# Patient Record
Sex: FEMALE | Race: BLACK OR AFRICAN AMERICAN | Employment: FULL TIME | URBAN - METROPOLITAN AREA
[De-identification: names, ages, dates, MRNs, and addresses within clinical notes are randomized per-mention and may not be internally consistent; named-entity substitution may affect disease eponyms.]

---

## 2021-06-28 ENCOUNTER — HOSPITAL ENCOUNTER (INPATIENT)
Age: 46
LOS: 4 days | Discharge: HOME | DRG: 603 | End: 2021-07-02
Attending: EMERGENCY MEDICINE | Admitting: FAMILY MEDICINE
Payer: COMMERCIAL

## 2021-06-28 ENCOUNTER — APPOINTMENT (OUTPATIENT)
Dept: GENERAL RADIOLOGY | Age: 46
DRG: 603 | End: 2021-06-28
Attending: EMERGENCY MEDICINE
Payer: COMMERCIAL

## 2021-06-28 DIAGNOSIS — I96 WET GANGRENE (HCC): ICD-10-CM

## 2021-06-28 DIAGNOSIS — L08.9 LEFT FOOT INFECTION: ICD-10-CM

## 2021-06-28 DIAGNOSIS — L03.90 CELLULITIS, UNSPECIFIED CELLULITIS SITE: Primary | ICD-10-CM

## 2021-06-28 LAB
ALBUMIN SERPL-MCNC: 3.9 G/DL (ref 3.5–5)
ALBUMIN/GLOB SERPL: 0.9 {RATIO} (ref 1.2–3.5)
ALP SERPL-CCNC: 73 U/L (ref 50–136)
ALT SERPL-CCNC: 16 U/L (ref 12–65)
ANION GAP SERPL CALC-SCNC: 9 MMOL/L (ref 7–16)
AST SERPL-CCNC: 16 U/L (ref 15–37)
BASOPHILS # BLD: 0.1 K/UL (ref 0–0.2)
BASOPHILS NFR BLD: 1 % (ref 0–2)
BILIRUB SERPL-MCNC: 0.4 MG/DL (ref 0.2–1.1)
BUN SERPL-MCNC: 8 MG/DL (ref 6–23)
CALCIUM SERPL-MCNC: 8.9 MG/DL (ref 8.3–10.4)
CHLORIDE SERPL-SCNC: 107 MMOL/L (ref 98–107)
CO2 SERPL-SCNC: 23 MMOL/L (ref 21–32)
CREAT SERPL-MCNC: 0.66 MG/DL (ref 0.6–1)
DIFFERENTIAL METHOD BLD: ABNORMAL
EOSINOPHIL # BLD: 0.3 K/UL (ref 0–0.8)
EOSINOPHIL NFR BLD: 4 % (ref 0.5–7.8)
ERYTHROCYTE [DISTWIDTH] IN BLOOD BY AUTOMATED COUNT: 12.4 % (ref 11.9–14.6)
GLOBULIN SER CALC-MCNC: 4.4 G/DL (ref 2.3–3.5)
GLUCOSE SERPL-MCNC: 88 MG/DL (ref 65–100)
HCT VFR BLD AUTO: 37.5 % (ref 35.8–46.3)
HGB BLD-MCNC: 12.8 G/DL (ref 11.7–15.4)
IMM GRANULOCYTES # BLD AUTO: 0 K/UL (ref 0–0.5)
IMM GRANULOCYTES NFR BLD AUTO: 0 % (ref 0–5)
INR PPP: 1.2
LACTATE SERPL-SCNC: 0.9 MMOL/L (ref 0.4–2)
LYMPHOCYTES # BLD: 1.7 K/UL (ref 0.5–4.6)
LYMPHOCYTES NFR BLD: 20 % (ref 13–44)
MCH RBC QN AUTO: 29.1 PG (ref 26.1–32.9)
MCHC RBC AUTO-ENTMCNC: 34.1 G/DL (ref 31.4–35)
MCV RBC AUTO: 85.2 FL (ref 79.6–97.8)
MONOCYTES # BLD: 1 K/UL (ref 0.1–1.3)
MONOCYTES NFR BLD: 11 % (ref 4–12)
NEUTS SEG # BLD: 5.7 K/UL (ref 1.7–8.2)
NEUTS SEG NFR BLD: 65 % (ref 43–78)
NRBC # BLD: 0 K/UL (ref 0–0.2)
PLATELET # BLD AUTO: 229 K/UL (ref 150–450)
PMV BLD AUTO: 8.3 FL (ref 9.4–12.3)
POTASSIUM SERPL-SCNC: 3.5 MMOL/L (ref 3.5–5.1)
PROT SERPL-MCNC: 8.3 G/DL (ref 6.3–8.2)
PROTHROMBIN TIME: 15.3 SEC (ref 12.5–14.7)
RBC # BLD AUTO: 4.4 M/UL (ref 4.05–5.2)
SODIUM SERPL-SCNC: 139 MMOL/L (ref 136–145)
WBC # BLD AUTO: 8.8 K/UL (ref 4.3–11.1)

## 2021-06-28 PROCEDURE — 80053 COMPREHEN METABOLIC PANEL: CPT

## 2021-06-28 PROCEDURE — 87077 CULTURE AEROBIC IDENTIFY: CPT

## 2021-06-28 PROCEDURE — 74011000258 HC RX REV CODE- 258: Performed by: FAMILY MEDICINE

## 2021-06-28 PROCEDURE — 99284 EMERGENCY DEPT VISIT MOD MDM: CPT

## 2021-06-28 PROCEDURE — 85025 COMPLETE CBC W/AUTO DIFF WBC: CPT

## 2021-06-28 PROCEDURE — 87070 CULTURE OTHR SPECIMN AEROBIC: CPT

## 2021-06-28 PROCEDURE — 73630 X-RAY EXAM OF FOOT: CPT

## 2021-06-28 PROCEDURE — 99221 1ST HOSP IP/OBS SF/LOW 40: CPT | Performed by: PHYSICIAN ASSISTANT

## 2021-06-28 PROCEDURE — 96365 THER/PROPH/DIAG IV INF INIT: CPT

## 2021-06-28 PROCEDURE — 87186 SC STD MICRODIL/AGAR DIL: CPT

## 2021-06-28 PROCEDURE — 74011250637 HC RX REV CODE- 250/637: Performed by: FAMILY MEDICINE

## 2021-06-28 PROCEDURE — 74011250636 HC RX REV CODE- 250/636: Performed by: FAMILY MEDICINE

## 2021-06-28 PROCEDURE — 74011250636 HC RX REV CODE- 250/636: Performed by: EMERGENCY MEDICINE

## 2021-06-28 PROCEDURE — 83605 ASSAY OF LACTIC ACID: CPT

## 2021-06-28 PROCEDURE — 74011000258 HC RX REV CODE- 258: Performed by: EMERGENCY MEDICINE

## 2021-06-28 PROCEDURE — 87040 BLOOD CULTURE FOR BACTERIA: CPT

## 2021-06-28 PROCEDURE — 2709999900 HC NON-CHARGEABLE SUPPLY

## 2021-06-28 PROCEDURE — 96367 TX/PROPH/DG ADDL SEQ IV INF: CPT

## 2021-06-28 PROCEDURE — 65270000029 HC RM PRIVATE

## 2021-06-28 PROCEDURE — 74011250636 HC RX REV CODE- 250/636: Performed by: INTERNAL MEDICINE

## 2021-06-28 PROCEDURE — 85610 PROTHROMBIN TIME: CPT

## 2021-06-28 RX ORDER — HYDROCODONE BITARTRATE AND ACETAMINOPHEN 5; 325 MG/1; MG/1
1 TABLET ORAL
Status: DISCONTINUED | OUTPATIENT
Start: 2021-06-28 | End: 2021-07-01

## 2021-06-28 RX ORDER — ENOXAPARIN SODIUM 100 MG/ML
40 INJECTION SUBCUTANEOUS DAILY
Status: DISCONTINUED | OUTPATIENT
Start: 2021-06-28 | End: 2021-07-02 | Stop reason: HOSPADM

## 2021-06-28 RX ORDER — ONDANSETRON 2 MG/ML
4 INJECTION INTRAMUSCULAR; INTRAVENOUS
Status: DISCONTINUED | OUTPATIENT
Start: 2021-06-28 | End: 2021-07-02 | Stop reason: HOSPADM

## 2021-06-28 RX ORDER — ACETAMINOPHEN 650 MG/1
650 SUPPOSITORY RECTAL
Status: DISCONTINUED | OUTPATIENT
Start: 2021-06-28 | End: 2021-07-02 | Stop reason: HOSPADM

## 2021-06-28 RX ORDER — VANCOMYCIN HYDROCHLORIDE
1250 ONCE
Status: COMPLETED | OUTPATIENT
Start: 2021-06-28 | End: 2021-06-28

## 2021-06-28 RX ORDER — ACETAMINOPHEN 325 MG/1
650 TABLET ORAL
Status: DISCONTINUED | OUTPATIENT
Start: 2021-06-28 | End: 2021-07-02 | Stop reason: HOSPADM

## 2021-06-28 RX ORDER — CLINDAMYCIN PHOSPHATE 600 MG/50ML
600 INJECTION INTRAVENOUS
Status: COMPLETED | OUTPATIENT
Start: 2021-06-28 | End: 2021-06-28

## 2021-06-28 RX ORDER — MORPHINE SULFATE 2 MG/ML
2 INJECTION, SOLUTION INTRAMUSCULAR; INTRAVENOUS
Status: DISCONTINUED | OUTPATIENT
Start: 2021-06-28 | End: 2021-07-02 | Stop reason: HOSPADM

## 2021-06-28 RX ORDER — POLYETHYLENE GLYCOL 3350 17 G/17G
17 POWDER, FOR SOLUTION ORAL DAILY PRN
Status: DISCONTINUED | OUTPATIENT
Start: 2021-06-28 | End: 2021-07-02 | Stop reason: HOSPADM

## 2021-06-28 RX ORDER — SODIUM CHLORIDE 0.9 % (FLUSH) 0.9 %
5-40 SYRINGE (ML) INJECTION AS NEEDED
Status: DISCONTINUED | OUTPATIENT
Start: 2021-06-28 | End: 2021-07-02 | Stop reason: HOSPADM

## 2021-06-28 RX ORDER — KETOROLAC TROMETHAMINE 15 MG/ML
15 INJECTION, SOLUTION INTRAMUSCULAR; INTRAVENOUS ONCE
Status: COMPLETED | OUTPATIENT
Start: 2021-06-28 | End: 2021-06-28

## 2021-06-28 RX ORDER — ONDANSETRON 4 MG/1
4 TABLET, ORALLY DISINTEGRATING ORAL
Status: DISCONTINUED | OUTPATIENT
Start: 2021-06-28 | End: 2021-07-02 | Stop reason: HOSPADM

## 2021-06-28 RX ORDER — SODIUM CHLORIDE 0.9 % (FLUSH) 0.9 %
5-40 SYRINGE (ML) INJECTION EVERY 8 HOURS
Status: DISCONTINUED | OUTPATIENT
Start: 2021-06-28 | End: 2021-07-02 | Stop reason: HOSPADM

## 2021-06-28 RX ADMIN — MORPHINE SULFATE 2 MG: 2 INJECTION, SOLUTION INTRAMUSCULAR; INTRAVENOUS at 08:27

## 2021-06-28 RX ADMIN — KETOROLAC TROMETHAMINE 15 MG: 15 INJECTION, SOLUTION INTRAMUSCULAR; INTRAVENOUS at 10:36

## 2021-06-28 RX ADMIN — PIPERACILLIN AND TAZOBACTAM 3.38 G: 3; .375 INJECTION, POWDER, LYOPHILIZED, FOR SOLUTION INTRAVENOUS at 15:36

## 2021-06-28 RX ADMIN — PIPERACILLIN AND TAZOBACTAM 3.38 G: 3; .375 INJECTION, POWDER, LYOPHILIZED, FOR SOLUTION INTRAVENOUS at 21:05

## 2021-06-28 RX ADMIN — VANCOMYCIN HYDROCHLORIDE 1250 MG: 10 INJECTION, POWDER, LYOPHILIZED, FOR SOLUTION INTRAVENOUS at 06:34

## 2021-06-28 RX ADMIN — Medication 10 ML: at 15:37

## 2021-06-28 RX ADMIN — HYDROCODONE BITARTRATE AND ACETAMINOPHEN 1 TABLET: 5; 325 TABLET ORAL at 08:16

## 2021-06-28 RX ADMIN — Medication 10 ML: at 21:05

## 2021-06-28 RX ADMIN — ENOXAPARIN SODIUM 40 MG: 40 INJECTION SUBCUTANEOUS at 10:36

## 2021-06-28 RX ADMIN — PIPERACILLIN SODIUM AND TAZOBACTAM SODIUM 4.5 G: 4; .5 INJECTION, POWDER, LYOPHILIZED, FOR SOLUTION INTRAVENOUS at 05:35

## 2021-06-28 RX ADMIN — CLINDAMYCIN PHOSPHATE 600 MG: 600 INJECTION, SOLUTION INTRAVENOUS at 06:05

## 2021-06-28 RX ADMIN — Medication 10 ML: at 07:00

## 2021-06-28 NOTE — WOUND CARE
Left foot with intertrigo, macerated skin between all toes, clear fluid filled blisters on dorsal foot and planter foot around the 3rd and 4th toes. Patient was seen and treated by a foot care doctor and given Naftin topical ointment for \"athletes foot\" per patient report, had been using about 2 weeks when this area began to blister and drain with foul smelling drainage with a petechial/ purple spots to 3rd and 4th toes. Recommend xeroform and dry gauze between toes and over blisters, every other day and prn saturation. Will monitor.

## 2021-06-28 NOTE — ED PROVIDER NOTES
82-year-old female presents with left foot pain and pain in her toes with warmth and drainage with a foul over onset 2 days ago. Patient did have some dry skin and peeling for several days prior to that. She denies any fever nausea or vomiting or history of diabetes or peripheral vascular disease. Pain is sharp and worse with use of the foot palpation and activity. No radiation of the pain. Foot Pain   Pertinent negatives include no numbness. History reviewed. No pertinent past medical history. History reviewed. No pertinent surgical history. History reviewed. No pertinent family history. Social History     Socioeconomic History    Marital status: SINGLE     Spouse name: Not on file    Number of children: Not on file    Years of education: Not on file    Highest education level: Not on file   Occupational History    Not on file   Tobacco Use    Smoking status: Never Smoker    Smokeless tobacco: Never Used   Substance and Sexual Activity    Alcohol use: Not Currently    Drug use: Not Currently    Sexual activity: Yes     Partners: Male   Other Topics Concern    Not on file   Social History Narrative    Not on file     Social Determinants of Health     Financial Resource Strain:     Difficulty of Paying Living Expenses:    Food Insecurity:     Worried About Running Out of Food in the Last Year:     [address] in the Last Year:    Transportation Needs:     Lack of Transportation (Medical):      Lack of Transportation (Non-Medical):    Physical Activity:     Days of Exercise per Week:     Minutes of Exercise per Session:    Stress:     Feeling of Stress :    Social Connections:     Frequency of Communication with Friends and Family:     Frequency of Social Gatherings with Friends and Family:     Attends [employer] Services:     Active Member of Clubs or Organizations:     Attends Club or Organization Meetings:     Marital Status:    Intimate Partner Violence:     Fear of Current or Ex-Partner:     Emotionally Abused:     Physically Abused:     Sexually Abused: ALLERGIES: Patient has no known allergies. Review of Systems   Constitutional: Negative for chills and fever. HENT: Negative for congestion and rhinorrhea. Respiratory: Negative for cough and shortness of breath. Cardiovascular: Negative for chest pain and leg swelling. Gastrointestinal: Negative for abdominal pain, diarrhea, nausea and vomiting. Endocrine: Negative for polydipsia and polyuria. Genitourinary: Negative for dysuria, frequency, hematuria and urgency. Musculoskeletal: Negative for arthralgias and myalgias. Skin: Positive for color change and wound. Neurological: Negative for weakness and numbness. Psychiatric/Behavioral: Negative for confusion. Vitals:    06/28/21 0414 06/28/21 0419   BP:  136/76   Pulse: 96    Resp: 16    Temp: 98.7 °F (37.1 °C)    SpO2: 96%    Weight: 82 kg (180 lb 12.4 oz)    Height: 5' 6\" (1.676 m)             Physical Exam  Vitals and nursing note reviewed. Constitutional:       General: She is not in acute distress. Appearance: She is well-developed. HENT:      Head: Normocephalic. Eyes:      Pupils: Pupils are equal, round, and reactive to light. Cardiovascular:      Rate and Rhythm: Normal rate and regular rhythm. Heart sounds: Normal heart sounds. Pulmonary:      Effort: Pulmonary effort is normal.      Breath sounds: Normal breath sounds. Abdominal:      General: There is no distension. Palpations: Abdomen is soft. There is no mass. Tenderness: There is no abdominal tenderness. There is no guarding or rebound. Musculoskeletal:         General: Tenderness ( Tender 5 toes and mid to distal dorsal and plantar foot. 2+ dorsalis pedis and posterior tibial pulses present) present. Normal range of motion. Lymphadenopathy:      Cervical: No cervical adenopathy. Skin:     General: Skin is warm and dry. Findings: Erythema (Pustular on dorsal distal foot rash with purulent drainage and foul odor extending into all 5 toes and especially in between the toe digits. Mid to distal foot is diffusely swollen and warm. Mild injection and erythema noted.) present. Neurological:      Mental Status: She is alert. MDM  Number of Diagnoses or Management Options  Diagnosis management comments: Foul-smelling foot drainage and cellulitis concerning for wet gangrene. Wound cultures blood cultures and x-ray in addition to blood work ordered. Broad-spectrum antibiotics ordered. Patient does not look ill or toxic but the foot looks badly infected. 6:16 AM  Do not appreciate obvious gas on the x-ray and the bony structures are intact. Lactic acid and white blood cell count are normal.  Patient is agreeable to admission for inpatient antibiotics as the infection appears severe and with a foul odor is likely polymicrobial.  Hospitalist agreed to admit the patient for IV antibiotics. No sign of sepsis.        Amount and/or Complexity of Data Reviewed  Clinical lab tests: ordered and reviewed (Results for orders placed or performed during the hospital encounter of 06/28/21  -CBC WITH AUTOMATED DIFF       Result                      Value             Ref Range           WBC                         8.8               4.3 - 11.1 K*       RBC                         4.40              4.05 - 5.2 M*       HGB                         12.8              11.7 - 15.4 *       HCT                         37.5              35.8 - 46.3 %       MCV                         85.2              79.6 - 97.8 *       MCH                         29.1              26.1 - 32.9 *       MCHC                        34.1              31.4 - 35.0 *       RDW                         12.4              11.9 - 14.6 %       PLATELET                    229               150 - 450 K/*       MPV                         8.3 (L)           9.4 - 12.3 FL ABSOLUTE NRBC               0.00              0.0 - 0.2 K/*       DF                          AUTOMATED                             NEUTROPHILS                 65                43 - 78 %           LYMPHOCYTES                 20                13 - 44 %           MONOCYTES                   11                4.0 - 12.0 %        EOSINOPHILS                 4                 0.5 - 7.8 %         BASOPHILS                   1                 0.0 - 2.0 %         IMMATURE GRANULOCYTES       0                 0.0 - 5.0 %         ABS. NEUTROPHILS            5.7               1.7 - 8.2 K/*       ABS. LYMPHOCYTES            1.7               0.5 - 4.6 K/*       ABS. MONOCYTES              1.0               0.1 - 1.3 K/*       ABS. EOSINOPHILS            0.3               0.0 - 0.8 K/*       ABS. BASOPHILS              0.1               0.0 - 0.2 K/*       ABS. IMM.  GRANS.            0.0               0.0 - 0.5 K/*  -LACTIC ACID       Result                      Value             Ref Range           Lactic acid                 0.9               0.4 - 2.0 MM*  -METABOLIC PANEL, COMPREHENSIVE       Result                      Value             Ref Range           Sodium                      139               136 - 145 mm*       Potassium                   3.5               3.5 - 5.1 mm*       Chloride                    107               98 - 107 mmo*       CO2                         23                21 - 32 mmol*       Anion gap                   9                 7 - 16 mmol/L       Glucose                     88                65 - 100 mg/*       BUN                         8                 6 - 23 MG/DL        Creatinine                  0.66              0.6 - 1.0 MG*       GFR est AA                  >60               >60 ml/min/1*       GFR est non-AA              >60               >60 ml/min/1*       Calcium                     8.9               8.3 - 10.4 M*       Bilirubin, total            0.4               0.2 - 1.1 MG*       ALT (SGPT)                  16                12 - 65 U/L         AST (SGOT)                  16                15 - 37 U/L         Alk. phosphatase            73                50 - 136 U/L        Protein, total              8.3 (H)           6.3 - 8.2 g/*       Albumin                     3.9               3.5 - 5.0 g/*       Globulin                    4.4 (H)           2.3 - 3.5 g/*       A-G Ratio                   0.9 (L)           1.2 - 3.5      -PROTHROMBIN TIME + INR       Result                      Value             Ref Range           Prothrombin time            15. 3 (H)          12.5 - 14.7 *       INR                         1.2                              )  Tests in the radiology section of CPT®: ordered and reviewed (XR FOOT LT MIN 3 V    Result Date: 6/28/2021  EXAMINATION: Left foot HISTORY: foot pain, infection toes, distal foot. TECHNIQUE: Frontal, lateral, and oblique views of the left foot. COMPARISON: None available. FINDINGS: There is no evidence of acute fracture or dislocation. Joint spaces are maintained. There is dorsal soft tissue swelling. No radiopaque foreign body.      No evidence of acute fracture or dislocation within the left foot.     )  Discuss the patient with other providers: yes    Risk of Complications, Morbidity, and/or Mortality  Presenting problems: high  Diagnostic procedures: low  Management options: moderate    Patient Progress  Patient progress: stable         Procedures

## 2021-06-28 NOTE — ROUTINE PROCESS
TRANSFER - OUT REPORT:    Verbal report given to Jordi Elias (name) on [facility]  being transferred to [facility](unit) for routine progression of care       Report consisted of patients Situation, Background, Assessment and   Recommendations(SBAR). Information from the following report(s) ED Summary was reviewed with the receiving nurse. Lines:   Peripheral IV 06/28/21 Left Antecubital (Active)        Opportunity for questions and clarification was provided.       Patient transported with:   [employer]

## 2021-06-28 NOTE — PROGRESS NOTES
Care Management Interventions  PCP Verified by CM: Yes  Mode of Transport at Discharge: Self  Current Support Network:  (brother and son live with her)  Confirm Follow Up Transport: Family  Discharge Location  Discharge Placement: Home    Patient screened for case Mgmt needs. Patient admitted with left foot cellulitis. She is a 42 yr old that works and has [employer]  ins (pt gave info to admitting but ins not yet showing in system)  Patient is normally indep with care and drives. Her 25 yr old son and brother live with her. She denies any needs on d/c but does not have a primary MD. She was very interested in obtaining a PCP on d/c. Pt referred to our [facility] group. They will call pt at home with appt that was request appt occur within 7 days of hospital d/c. Will follow until d/c.

## 2021-06-28 NOTE — PROGRESS NOTES
Admitted to the unit, no acute distress, alert, oriented X 4, neurovascular checks WDL, left foot serous drainage, edematous +2, moves toes, sensation, ten [place] to touch, pedal pulse +2, brother at bedside.

## 2021-06-28 NOTE — PROGRESS NOTES
Left foot serous drainage, dorsal and plantar elda pf the foot, dry Telfa pad 4 X 4's applied secured with Kerlix and tape, awaiting wound care management.

## 2021-06-28 NOTE — PROGRESS NOTES
Progress Note    Patient: Juancarlos Willett MRN: [id number]  SSN: [id number]    YOB: 1975  Age: 42 y.o. Sex: female      Admit Date: 6/28/2021    LOS: 0 days     Assessment and Plan:   42 y.o. F with no reported PMH who presents to the ER with complaint of L foot pain and drainage    1. Left foot cellulitis  - Continue zosyn and vancomycin  - Follow BCx - NGTD  - Follow wound Cx - GPC and GNR  - Obtain MRI of L foot  - Pain management   - Currently not septic  - Ortho recs appreciated     DVT ppx lovenox      Subjective:   42 y.o. F with no reported PMH who presents to the ER with complaint of L foot pain and drainage. Patient seen and examined at bedside. This morning still having L foot pain. Denies chest pain, no SOB, no nausea. Objective:     Vitals:    06/28/21 0414 06/28/21 0419 06/28/21 0657 06/28/21 1101   BP:  136/76 (!) 133/47 (!) 99/58   Pulse: 96  64 66   Resp: 16  16 16   Temp: 98.7 °F (37.1 °C)  98.8 °F (37.1 °C) 98.3 °F (36.8 °C)   SpO2: 96%  100% 98%   Weight: 82 kg (180 lb 12.4 oz)      Height: 5' 6\" (1.676 m)           Intake and Output:  Current Shift: No intake/output data recorded. Last three shifts: No intake/output data recorded.     ROS  10 ROS negative except from stated on subjective    Physical Exam:   General: Alert, oriented, NAD  HEENT: NC/AT, EOM are intact  Neck: supple, no JVD  Cardiovascular: RRR, S1, S2, no murmurs  Respiratory: Lungs are clear, no wheezes or rales  Abdomen: Soft, NT, ND  Back: No CVA tenderness, no paraspinal tenderness  Extremities: LE without pedal edema, no erythema  Neuro: A&O, CN are intact, no focal deficits  Skin: no rash or ulcers  Psych: good mood and affect    Lab/Data Review:  I have personally reviewed patients laboratory data showing  Recent Results (from the past 24 hour(s))   CBC WITH AUTOMATED DIFF    Collection Time: 06/28/21  5:09 AM   Result Value Ref Range    WBC 8.8 4.3 - 11.1 K/uL    RBC 4.40 4.05 - 5.2 M/uL    HGB 12.8 11.7 - 15.4 g/dL    HCT 37.5 35.8 - 46.3 %    MCV 85.2 79.6 - 97.8 FL    MCH 29.1 26.1 - 32.9 PG    MCHC 34.1 31.4 - 35.0 g/dL    RDW 12.4 11.9 - 14.6 %    PLATELET 242 802 - 095 K/uL    MPV 8.3 (L) 9.4 - 12.3 FL    ABSOLUTE NRBC 0.00 0.0 - 0.2 K/uL    DF AUTOMATED      NEUTROPHILS 65 43 - 78 %    LYMPHOCYTES 20 13 - 44 %    MONOCYTES 11 4.0 - 12.0 %    EOSINOPHILS 4 0.5 - 7.8 %    BASOPHILS 1 0.0 - 2.0 %    IMMATURE GRANULOCYTES 0 0.0 - 5.0 %    ABS. NEUTROPHILS 5.7 1.7 - 8.2 K/UL    ABS. LYMPHOCYTES 1.7 0.5 - 4.6 K/UL    ABS. MONOCYTES 1.0 0.1 - 1.3 K/UL    ABS. EOSINOPHILS 0.3 0.0 - 0.8 K/UL    ABS. BASOPHILS 0.1 0.0 - 0.2 K/UL    ABS. IMM. GRANS. 0.0 0.0 - 0.5 K/UL   LACTIC ACID    Collection Time: 06/28/21  5:09 AM   Result Value Ref Range    Lactic acid 0.9 0.4 - 2.0 MMOL/L   METABOLIC PANEL, COMPREHENSIVE    Collection Time: 06/28/21  5:09 AM   Result Value Ref Range    Sodium 139 136 - 145 mmol/L    Potassium 3.5 3.5 - 5.1 mmol/L    Chloride 107 98 - 107 mmol/L    CO2 23 21 - 32 mmol/L    Anion gap 9 7 - 16 mmol/L    Glucose 88 65 - 100 mg/dL    BUN 8 6 - 23 MG/DL    Creatinine 0.66 0.6 - 1.0 MG/DL    GFR est AA >60 >60 ml/min/1.73m2    GFR est non-AA >60 >60 ml/min/1.73m2    Calcium 8.9 8.3 - 10.4 MG/DL    Bilirubin, total 0.4 0.2 - 1.1 MG/DL    ALT (SGPT) 16 12 - 65 U/L    AST (SGOT) 16 15 - 37 U/L    Alk.  phosphatase 73 50 - 136 U/L    Protein, total 8.3 (H) 6.3 - 8.2 g/dL    Albumin 3.9 3.5 - 5.0 g/dL    Globulin 4.4 (H) 2.3 - 3.5 g/dL    A-G Ratio 0.9 (L) 1.2 - 3.5     PROTHROMBIN TIME + INR    Collection Time: 06/28/21  5:09 AM   Result Value Ref Range    Prothrombin time 15.3 (H) 12.5 - 14.7 sec    INR 1.2     CULTURE, WOUND W GRAM STAIN    Collection Time: 06/28/21  5:09 AM    Specimen: Foot    LEFT   Result Value Ref Range    Special Requests: NO SPECIAL REQUESTS      GRAM STAIN 0 TO 4 WBC'S/OIF     GRAM STAIN MANY GRAM POS COCCI IN CHAINS      GRAM STAIN FEW GRAM NEGATIVE RODS      Culture result: PENDING       All Micro Results     Procedure Component Value Units Date/Time    CULTURE, Stepan Colon STAIN [[id number]] Collected: 06/28/21 0509    Order Status: Completed Specimen: Foot Updated: 06/28/21 1147     Special Requests: NO SPECIAL REQUESTS        GRAM STAIN 0 TO 4 WBC'S/OIF            MANY GRAM POS COCCI IN CHAINS            FEW GRAM NEGATIVE RODS        Culture result: PENDING    CULTURE, BLOOD [[id number]] Collected: 06/28/21 0532    Order Status: Completed Specimen: Blood Updated: 06/28/21 0535    CULTURE, BLOOD [[id number]] Collected: 06/28/21 0509    Order Status: Completed Specimen: Blood Updated: 06/28/21 0573           Image:  I have personally reviewed patients imaging showing  XR FOOT LT MIN 3 V   Final Result   No evidence of acute fracture or dislocation within the left foot. MRI FOOT LT W WO CONT    (Results Pending)        Hospital problems     Principal Problem:    Left foot infection (6/28/2021)        I have reviewed, updated, and verified this note's content and spent 38 minutes of my 42 minutes visit performing counseling and coordination of care regarding medical management.        Signed By: Sav Porras MD     June 28, 2021

## 2021-06-28 NOTE — CONSULTS
[facility], [place]/[facility]  Consultation Note    Patient ID:  Name: Deedee Ma  MRN: [id number]  AGE: 42 y.o.  : 1975    Date of Consultation:  2021  Referring Physician: Hospitalist    Subjective: Pt complains of left foot pain and swelling that she says started on Friday. Prior to this she was being treated with Naftin for what she thought was athlete's foot. They presented to the [facility] Emergency Dept on . They were found to have left foot infection. They were admitted by the hospitalist. They localizes their pain to all of the digits on the left foot. Very tender. They have no other orthopedic concerns at this time. No known vascular diseases. Past Medical History Includes: History reviewed. No pertinent past medical history. , History reviewed. No pertinent surgical history. Family History: History reviewed. No pertinent family history.    Social History:   Social History     Tobacco Use    Smoking status: Never Smoker    Smokeless tobacco: Never Used   Substance Use Topics    Alcohol use: Not Currently       ALLERGIES: No Known Allergies     Patient Medications    Current Facility-Administered Medications   Medication Dose Route Frequency    sodium chloride (NS) flush 5-40 mL  5-40 mL IntraVENous Q8H    sodium chloride (NS) flush 5-40 mL  5-40 mL IntraVENous PRN    acetaminophen (TYLENOL) tablet 650 mg  650 mg Oral Q6H PRN    Or    acetaminophen (TYLENOL) suppository 650 mg  650 mg Rectal Q6H PRN    polyethylene glycol (MIRALAX) packet 17 g  17 g Oral DAILY PRN    ondansetron (ZOFRAN ODT) tablet 4 mg  4 mg Oral Q8H PRN    Or    ondansetron (ZOFRAN) injection 4 mg  4 mg IntraVENous Q6H PRN    enoxaparin (LOVENOX) injection 40 mg  40 mg SubCUTAneous DAILY    piperacillin-tazobactam (ZOSYN) 3.375 g in 0.9% sodium chloride (MBP/ADV) 100 mL MBP  3.375 g IntraVENous Q8H    HYDROcodone-acetaminophen (NORCO) 5-325 mg per tablet 1 Tablet  1 Tablet Oral Q4H PRN    morphine injection 2 mg  2 mg IntraVENous Q4H PRN    ketorolac (TORADOL) injection 15 mg  15 mg IntraVENous ONCE         Review of Systems:  A comprehensive review of systems was negative except for that written in the HPI. Physical Exam:      General: NAD, Alert, Oriented x 3   Mental Status: Appropriate   Psych: Normal Affect, Normal Mood    HEENT: Normal Cephalic/Atraumatic, PERRL   Lungs: Respirations even and unlabored, Breath Sounds were clear, no respiratory distress   Heart: Regular Rate and Rhythm   Vascular: Distal pulses intact, good capillary refill   Skin: Diffuse swelling of the left foot. There is no fluctuance. There serous weeping. No ulcerated wounds. No streaking  Musculoskeletal: Fallen arch left foot. Good normal pain-free range of motion left ankle. Pain on palpation of each digit of the left foot. Lymphatic: No lympahdenopathy, No distal edema   Neuro: No gross deficits   Abdomen: Soft, Non tender, No distension      VITALS:   Patient Vitals for the past 8 hrs:   BP Temp Pulse Resp SpO2 Height Weight   21 0657 (!) 133/47 98.8 °F (37.1 °C) 64 16 100 %     21 0419 136/76         21 0414  98.7 °F (37.1 °C) 96 16 96 % 5' 6\" (1.676 m) 180 lb 12.4 oz (82 kg)    , Temp (24hrs), Av.8 °F (37.1 °C), Min:98.7 °F (37.1 °C), Max:98.8 °F (37.1 °C)           Diagnosis   Patient Active Problem List   Diagnosis Code    Left foot infection L08.9          Assessment     Left foot cellulitis    Medical Decision Making:     X-rays and chart have been reviewed. The patient peers to have cellulitis of the left foot. I recommended MRI scan of the left foot to rule out abscess. Continue medical management per hospitalist.  Left foot dressings per wound care.            JENARO Claudio  2021,  9:40 AM

## 2021-06-28 NOTE — H&P
[facility]  History & Physical  NAME:  Allie Talavera   Age:  42 y.o.  :   1975   MRN:   [id number]  PCP: None  Treatment Team: Attending Provider: Joel Fleming MD; Primary Nurse: Terese Aguilar RN    Admission Date: 2021  Chief Complaint: foot pain and drainage  Reason for Admission: L foot infection    Assessment and Plan:   L Foot Infection  - Evidence of blisters, oozing, and cellulitis on exam  - Foul-smelling  - Plain films show no evidence of gas or foreign body  - Pain not out of proportion to injury  - WBC is normal, as well as normal LA, no evidence of systemic infection  - Foot is very poor appearing  - Started on broad spectrum antibiotics for concern for wet gangrene  - May need additional imaging if symptoms worsen  - No known h/o DM or vascular disorder  - Will consult with wound care      Disposition: inpatient  Diet: regular  VTE ppx: lovenox  CODE STATUS: FULL        HPI:   Patient is a 42 y.o. F with no reported PMH who presents to the ER with complaint of L foot pain and drainage. Denies known injury. Reports pain is worse on walking or palpation. Reports that her foot was dry over the past few weeks, and she had been seeing a foot doctor about it and was diagnosed with \"athlete's foot\" and started on a \"2% cream.\"  She had been using this foot cream and thought that it was improving until Friday when her foot began to peel and ooze. It has gotten progressively worse over the weekend. Upon ER workup, labs are unremarkable, but foot clinically appears very poor. Hospitalist asked to admit for IV antibiotics. A 14-point review of systems was taken, and pertinent positives noted as per HPI. Past Medical History reviewed, no known medical problems. History reviewed. No pertinent surgical history. Past Surgical History reviewed.     No Known Allergies     Social History     Tobacco Use    Smoking status: Never Smoker    Smokeless tobacco: Never Used Substance Use Topics    Alcohol use: Not Currently   Social History reviewed. History reviewed. No pertinent family history. Family History reviewed and is non-contributory to current presentation. Objective:     Visit Vitals  /76   Pulse 96   Temp 98.7 °F (37.1 °C)   Resp 16   Ht 5' 6\" (1.676 m)   Wt 82 kg (180 lb 12.4 oz)   SpO2 96%   BMI 29.18 kg/m²      Temp (24hrs), Av.7 °F (37.1 °C), Min:98.7 °F (37.1 °C), Max:98.7 °F (37.1 °C)    Oxygen Therapy  O2 Sat (%): 96 % (21 4897)  O2 Device: None (Room air) (21 1195)  PHYSICAL EXAM  General:    Alert, cooperative, no distress, appears stated age. Head:   Normocephalic, without obvious abnormality, atraumatic. Nose:  Nares normal. No drainage or sinus tenderness. Lungs:   Clear to auscultation bilaterally. No Wheezing or Rhonchi. No rales. Heart:   Regular rate and rhythm, with normal S1 and S2. Abdomen:   Soft, non-tender. Not distended. Bowel sounds normal.   Extremities: Erythema of dorsal L foot. Pustules with drainage noted at L foot toe bases and interdigital spaces. Moist appearing. L foot is swollen and warm. TTP throughout mid and distal foot. DP pulse present. Foul smelling. Skin:     Texture, turgor normal.  Not Jaundiced. Other skin findings as above. Neurologic: Alert and oriented x 3, no focal deficits. Psychiatric: Normal mood and affect.      None       Data Review:   Recent Results (from the past 24 hour(s))   CBC WITH AUTOMATED DIFF    Collection Time: 21  5:09 AM   Result Value Ref Range    WBC 8.8 4.3 - 11.1 K/uL    RBC 4.40 4.05 - 5.2 M/uL    HGB 12.8 11.7 - 15.4 g/dL    HCT 37.5 35.8 - 46.3 %    MCV 85.2 79.6 - 97.8 FL    MCH 29.1 26.1 - 32.9 PG    MCHC 34.1 31.4 - 35.0 g/dL    RDW 12.4 11.9 - 14.6 %    PLATELET 873 559 - 343 K/uL    MPV 8.3 (L) 9.4 - 12.3 FL    ABSOLUTE NRBC 0.00 0.0 - 0.2 K/uL    DF AUTOMATED      NEUTROPHILS 65 43 - 78 %    LYMPHOCYTES 20 13 - 44 %    MONOCYTES 11 4.0 - 12.0 %    EOSINOPHILS 4 0.5 - 7.8 %    BASOPHILS 1 0.0 - 2.0 %    IMMATURE GRANULOCYTES 0 0.0 - 5.0 %    ABS. NEUTROPHILS 5.7 1.7 - 8.2 K/UL    ABS. LYMPHOCYTES 1.7 0.5 - 4.6 K/UL    ABS. MONOCYTES 1.0 0.1 - 1.3 K/UL    ABS. EOSINOPHILS 0.3 0.0 - 0.8 K/UL    ABS. BASOPHILS 0.1 0.0 - 0.2 K/UL    ABS. IMM. GRANS. 0.0 0.0 - 0.5 K/UL   LACTIC ACID    Collection Time: 06/28/21  5:09 AM   Result Value Ref Range    Lactic acid 0.9 0.4 - 2.0 MMOL/L   METABOLIC PANEL, COMPREHENSIVE    Collection Time: 06/28/21  5:09 AM   Result Value Ref Range    Sodium 139 136 - 145 mmol/L    Potassium 3.5 3.5 - 5.1 mmol/L    Chloride 107 98 - 107 mmol/L    CO2 23 21 - 32 mmol/L    Anion gap 9 7 - 16 mmol/L    Glucose 88 65 - 100 mg/dL    BUN 8 6 - 23 MG/DL    Creatinine 0.66 0.6 - 1.0 MG/DL    GFR est AA >60 >60 ml/min/1.73m2    GFR est non-AA >60 >60 ml/min/1.73m2    Calcium 8.9 8.3 - 10.4 MG/DL    Bilirubin, total 0.4 0.2 - 1.1 MG/DL    ALT (SGPT) 16 12 - 65 U/L    AST (SGOT) 16 15 - 37 U/L    Alk.  phosphatase 73 50 - 136 U/L    Protein, total 8.3 (H) 6.3 - 8.2 g/dL    Albumin 3.9 3.5 - 5.0 g/dL    Globulin 4.4 (H) 2.3 - 3.5 g/dL    A-G Ratio 0.9 (L) 1.2 - 3.5     PROTHROMBIN TIME + INR    Collection Time: 06/28/21  5:09 AM   Result Value Ref Range    Prothrombin time 15.3 (H) 12.5 - 14.7 sec    INR 1.2         Imaging /Procedures /Studies:  EKG Results     None          Signed By: Marlena Eugene MD     June 28, 2021

## 2021-06-29 LAB
ANION GAP SERPL CALC-SCNC: 6 MMOL/L (ref 7–16)
BUN SERPL-MCNC: 10 MG/DL (ref 6–23)
CALCIUM SERPL-MCNC: 8.5 MG/DL (ref 8.3–10.4)
CHLORIDE SERPL-SCNC: 111 MMOL/L (ref 98–107)
CO2 SERPL-SCNC: 23 MMOL/L (ref 21–32)
CREAT SERPL-MCNC: 0.55 MG/DL (ref 0.6–1)
ERYTHROCYTE [DISTWIDTH] IN BLOOD BY AUTOMATED COUNT: 12.5 % (ref 11.9–14.6)
GLUCOSE SERPL-MCNC: 70 MG/DL (ref 65–100)
HCT VFR BLD AUTO: 35.2 % (ref 35.8–46.3)
HGB BLD-MCNC: 11.8 G/DL (ref 11.7–15.4)
MCH RBC QN AUTO: 29 PG (ref 26.1–32.9)
MCHC RBC AUTO-ENTMCNC: 33.5 G/DL (ref 31.4–35)
MCV RBC AUTO: 86.5 FL (ref 79.6–97.8)
NRBC # BLD: 0 K/UL (ref 0–0.2)
PLATELET # BLD AUTO: 218 K/UL (ref 150–450)
PMV BLD AUTO: 8.8 FL (ref 9.4–12.3)
POTASSIUM SERPL-SCNC: 3.8 MMOL/L (ref 3.5–5.1)
RBC # BLD AUTO: 4.07 M/UL (ref 4.05–5.2)
SODIUM SERPL-SCNC: 140 MMOL/L (ref 136–145)
WBC # BLD AUTO: 6 K/UL (ref 4.3–11.1)

## 2021-06-29 PROCEDURE — 65270000029 HC RM PRIVATE

## 2021-06-29 PROCEDURE — 74011250636 HC RX REV CODE- 250/636: Performed by: INTERNAL MEDICINE

## 2021-06-29 PROCEDURE — 36415 COLL VENOUS BLD VENIPUNCTURE: CPT

## 2021-06-29 PROCEDURE — 85027 COMPLETE CBC AUTOMATED: CPT

## 2021-06-29 PROCEDURE — 80048 BASIC METABOLIC PNL TOTAL CA: CPT

## 2021-06-29 PROCEDURE — 74011000258 HC RX REV CODE- 258: Performed by: FAMILY MEDICINE

## 2021-06-29 PROCEDURE — 74011250636 HC RX REV CODE- 250/636: Performed by: FAMILY MEDICINE

## 2021-06-29 PROCEDURE — 2709999900 HC NON-CHARGEABLE SUPPLY

## 2021-06-29 PROCEDURE — 74011250637 HC RX REV CODE- 250/637: Performed by: FAMILY MEDICINE

## 2021-06-29 RX ORDER — VANCOMYCIN 1.75 GRAM/500 ML IN 0.9 % SODIUM CHLORIDE INTRAVENOUS
1750 ONCE
Status: COMPLETED | OUTPATIENT
Start: 2021-06-29 | End: 2021-06-29

## 2021-06-29 RX ORDER — VANCOMYCIN HYDROCHLORIDE
1250 EVERY 12 HOURS
Status: DISCONTINUED | OUTPATIENT
Start: 2021-06-30 | End: 2021-07-01 | Stop reason: DRUGHIGH

## 2021-06-29 RX ADMIN — HYDROCODONE BITARTRATE AND ACETAMINOPHEN 1 TABLET: 5; 325 TABLET ORAL at 17:43

## 2021-06-29 RX ADMIN — VANCOMYCIN HYDROCHLORIDE 1750 MG: 10 INJECTION, POWDER, LYOPHILIZED, FOR SOLUTION INTRAVENOUS at 16:32

## 2021-06-29 RX ADMIN — HYDROCODONE BITARTRATE AND ACETAMINOPHEN 1 TABLET: 5; 325 TABLET ORAL at 03:07

## 2021-06-29 RX ADMIN — MORPHINE SULFATE 2 MG: 2 INJECTION, SOLUTION INTRAMUSCULAR; INTRAVENOUS at 18:46

## 2021-06-29 RX ADMIN — Medication 10 ML: at 21:00

## 2021-06-29 RX ADMIN — PIPERACILLIN AND TAZOBACTAM 3.38 G: 3; .375 INJECTION, POWDER, LYOPHILIZED, FOR SOLUTION INTRAVENOUS at 21:00

## 2021-06-29 RX ADMIN — ENOXAPARIN SODIUM 40 MG: 40 INJECTION SUBCUTANEOUS at 08:52

## 2021-06-29 RX ADMIN — PIPERACILLIN AND TAZOBACTAM 3.38 G: 3; .375 INJECTION, POWDER, LYOPHILIZED, FOR SOLUTION INTRAVENOUS at 13:53

## 2021-06-29 RX ADMIN — PIPERACILLIN AND TAZOBACTAM 3.38 G: 3; .375 INJECTION, POWDER, LYOPHILIZED, FOR SOLUTION INTRAVENOUS at 05:09

## 2021-06-29 RX ADMIN — Medication 10 ML: at 05:09

## 2021-06-29 RX ADMIN — Medication 10 ML: at 16:40

## 2021-06-29 NOTE — PROGRESS NOTES
Pharmacokinetic Consult to Pharmacist    Ernestinae Spatz is a 42 y.o. female being treated for SSTI with zosyn and vancomycin. Height: 5' 6\" (167.6 cm)  Weight: 82 kg (180 lb 12.4 oz)  Lab Results   Component Value Date/Time    BUN 10 06/29/2021 05:37 AM    Creatinine 0.55 (L) 06/29/2021 05:37 AM    WBC 6.0 06/29/2021 05:37 AM    Lactic acid 0.9 06/28/2021 05:09 AM      Estimated Creatinine Clearance: 110.7 mL/min (A) (by C-G formula based on SCr of 0.55 mg/dL (L)). CULTURES:  Results     Procedure Component Value Units Date/Time    CULTURE, BLOOD [[id number]] Collected: 06/28/21 0532    Order Status: Completed Specimen: Blood Updated: 06/29/21 0709     Special Requests: --        RIGHT  Antecubital       Culture result: NO GROWTH 1 DAY       CULTURE, BLOOD [[id number]] Collected: 06/28/21 0509    Order Status: Completed Specimen: Blood Updated: 06/29/21 0709     Special Requests: --        LEFT  Antecubital       Culture result: NO GROWTH 1 DAY       CULTURE, Ripley Lessen STAIN [[id number]]  (Abnormal) Collected: 06/28/21 0509    Order Status: Completed Specimen: Foot Updated: 06/29/21 0915     Special Requests: NO SPECIAL REQUESTS        GRAM STAIN 0 TO 4 WBC'S/OIF            MANY GRAM POS COCCI IN CHAINS            FEW GRAM NEGATIVE RODS        Culture result:       HEAVY GRAM POSITIVE COCCI SUBCULTURE IN PROGRESS                  CULTURE IN PROGRESS,FURTHER UPDATES TO FOLLOW                        Day 1 of vancomycin. Goal trough is 10-20. We will initiate therapy with vancomycin 1750mg x 1 followed by 1250mg q12h. We will continue to follow the patient and order levels when clinically indicated.       Thank you,  Colleen Melchor, PaulD

## 2021-06-29 NOTE — PROGRESS NOTES
Progress Note    Patient: Jae Dukes MRN: [id number]  SSN: [id number]    YOB: 1975  Age: 42 y.o. Sex: female      Admit Date: 6/28/2021    LOS: 1 day     Assessment and Plan:   42 y.o. F with no reported PMH who presents to the ER with complaint of L foot pain and drainage    1. Left foot wound infection with surrounding cellulitis  - Continue zosyn and vancomycin  - Follow BCx - NGTD  - Follow wound Cx - GPC and GNR  - MRI of L foot pending   - Pain management   - Currently not septic  - Ortho recs appreciated     DVT ppx lovenox      Subjective:   42 y.o. F with no reported PMH who presents to the ER with complaint of L foot pain and drainage. Patient seen and examined at bedside. This morning still having L foot pain, but improved from yesterday. Denies chest pain, no SOB, no nausea. Objective:     Vitals:    06/28/21 1921 06/28/21 2330 06/29/21 0313 06/29/21 0732   BP: (!) 90/55 105/67 115/79 110/72   Pulse: 78 84 75 76   Resp: 16 16 16 16   Temp: 98.3 °F (36.8 °C) 98.4 °F (36.9 °C) 98.1 °F (36.7 °C) 98 °F (36.7 °C)   SpO2: 100% 95% 98% 97%   Weight:       Height:            Intake and Output:  Current Shift: No intake/output data recorded.   Last three shifts: 06/27 1901 - 06/29 0700  In: 520 [P.O.:360; I.V.:160]  Out: -     ROS  10 ROS negative except from stated on subjective    Physical Exam:   General: Alert, oriented, NAD  HEENT: NC/AT, EOM are intact  Neck: supple, no JVD  Cardiovascular: RRR, S1, S2, no murmurs  Respiratory: Lungs are clear, no wheezes or rales  Abdomen: Soft, NT, ND  Back: No CVA tenderness, no paraspinal tenderness  Extremities: Left foot edema and erythema  Neuro: A&O, CN are intact, no focal deficits  Skin: no rash or ulcers  Psych: good mood and affect    Lab/Data Review:  I have personally reviewed patients laboratory data showing  Recent Results (from the past 24 hour(s))   METABOLIC PANEL, BASIC    Collection Time: 06/29/21  5:37 AM   Result Value Ref Range    Sodium 140 136 - 145 mmol/L    Potassium 3.8 3.5 - 5.1 mmol/L    Chloride 111 (H) 98 - 107 mmol/L    CO2 23 21 - 32 mmol/L    Anion gap 6 (L) 7 - 16 mmol/L    Glucose 70 65 - 100 mg/dL    BUN 10 6 - 23 MG/DL    Creatinine 0.55 (L) 0.6 - 1.0 MG/DL    GFR est AA >60 >60 ml/min/1.73m2    GFR est non-AA >60 >60 ml/min/1.73m2    Calcium 8.5 8.3 - 10.4 MG/DL   CBC W/O DIFF    Collection Time: 06/29/21  5:37 AM   Result Value Ref Range    WBC 6.0 4.3 - 11.1 K/uL    RBC 4.07 4.05 - 5.2 M/uL    HGB 11.8 11.7 - 15.4 g/dL    HCT 35.2 (L) 35.8 - 46.3 %    MCV 86.5 79.6 - 97.8 FL    MCH 29.0 26.1 - 32.9 PG    MCHC 33.5 31.4 - 35.0 g/dL    RDW 12.5 11.9 - 14.6 %    PLATELET 856 020 - 482 K/uL    MPV 8.8 (L) 9.4 - 12.3 FL    ABSOLUTE NRBC 0.00 0.0 - 0.2 K/uL      All Micro Results     Procedure Component Value Units Date/Time    CULTURE, Quinteros Current STAIN [[id number]]  (Abnormal) Collected: 06/28/21 0509    Order Status: Completed Specimen: Foot Updated: 06/29/21 0915     Special Requests: NO SPECIAL REQUESTS        GRAM STAIN 0 TO 4 WBC'S/OIF            MANY GRAM POS COCCI IN CHAINS            FEW GRAM NEGATIVE RODS        Culture result:       HEAVY GRAM POSITIVE COCCI SUBCULTURE IN PROGRESS                  CULTURE IN PROGRESS,FURTHER UPDATES TO FOLLOW          CULTURE, BLOOD [[id number]] Collected: 06/28/21 0509    Order Status: Completed Specimen: Blood Updated: 06/29/21 0709     Special Requests: --        LEFT  Antecubital       Culture result: NO GROWTH 1 DAY       CULTURE, BLOOD [[id number]] Collected: 06/28/21 0532    Order Status: Completed Specimen: Blood Updated: 06/29/21 0709     Special Requests: --        RIGHT  Antecubital       Culture result: NO GROWTH 1 DAY              Image:  I have personally reviewed patients imaging showing  XR FOOT LT MIN 3 V   Final Result   No evidence of acute fracture or dislocation within the left foot.          MRI FOOT LT W WO CONT    (Results Pending)        Hospital problems     Principal Problem:    Left foot infection (6/28/2021)        Signed By: Juan J Monaco MD     June 29, 2021

## 2021-06-30 ENCOUNTER — APPOINTMENT (OUTPATIENT)
Dept: MRI IMAGING | Age: 46
DRG: 603 | End: 2021-06-30
Attending: PHYSICIAN ASSISTANT
Payer: COMMERCIAL

## 2021-06-30 LAB
ANION GAP SERPL CALC-SCNC: 6 MMOL/L (ref 7–16)
BUN SERPL-MCNC: 9 MG/DL (ref 6–23)
CALCIUM SERPL-MCNC: 8.7 MG/DL (ref 8.3–10.4)
CHLORIDE SERPL-SCNC: 108 MMOL/L (ref 98–107)
CO2 SERPL-SCNC: 25 MMOL/L (ref 21–32)
CREAT SERPL-MCNC: 0.58 MG/DL (ref 0.6–1)
ERYTHROCYTE [DISTWIDTH] IN BLOOD BY AUTOMATED COUNT: 12.4 % (ref 11.9–14.6)
GLUCOSE SERPL-MCNC: 93 MG/DL (ref 65–100)
HCT VFR BLD AUTO: 37.1 % (ref 35.8–46.3)
HGB BLD-MCNC: 12.5 G/DL (ref 11.7–15.4)
MCH RBC QN AUTO: 28.7 PG (ref 26.1–32.9)
MCHC RBC AUTO-ENTMCNC: 33.7 G/DL (ref 31.4–35)
MCV RBC AUTO: 85.3 FL (ref 79.6–97.8)
NRBC # BLD: 0 K/UL (ref 0–0.2)
PLATELET # BLD AUTO: 222 K/UL (ref 150–450)
PMV BLD AUTO: 8.3 FL (ref 9.4–12.3)
POTASSIUM SERPL-SCNC: 3.1 MMOL/L (ref 3.5–5.1)
RBC # BLD AUTO: 4.35 M/UL (ref 4.05–5.2)
SODIUM SERPL-SCNC: 139 MMOL/L (ref 136–145)
WBC # BLD AUTO: 6 K/UL (ref 4.3–11.1)

## 2021-06-30 PROCEDURE — 2709999900 HC NON-CHARGEABLE SUPPLY

## 2021-06-30 PROCEDURE — 99231 SBSQ HOSP IP/OBS SF/LOW 25: CPT | Performed by: PHYSICIAN ASSISTANT

## 2021-06-30 PROCEDURE — 74011636320 HC RX REV CODE- 636/320: Performed by: INTERNAL MEDICINE

## 2021-06-30 PROCEDURE — 74011000258 HC RX REV CODE- 258: Performed by: FAMILY MEDICINE

## 2021-06-30 PROCEDURE — 74011250636 HC RX REV CODE- 250/636: Performed by: INTERNAL MEDICINE

## 2021-06-30 PROCEDURE — 74011250636 HC RX REV CODE- 250/636: Performed by: FAMILY MEDICINE

## 2021-06-30 PROCEDURE — 73720 MRI LWR EXTREMITY W/O&W/DYE: CPT

## 2021-06-30 PROCEDURE — 36415 COLL VENOUS BLD VENIPUNCTURE: CPT

## 2021-06-30 PROCEDURE — 65270000029 HC RM PRIVATE

## 2021-06-30 PROCEDURE — 85027 COMPLETE CBC AUTOMATED: CPT

## 2021-06-30 PROCEDURE — A9576 INJ PROHANCE MULTIPACK: HCPCS | Performed by: INTERNAL MEDICINE

## 2021-06-30 PROCEDURE — 80048 BASIC METABOLIC PNL TOTAL CA: CPT

## 2021-06-30 PROCEDURE — 74011250637 HC RX REV CODE- 250/637: Performed by: FAMILY MEDICINE

## 2021-06-30 RX ORDER — FLUCONAZOLE 2 MG/ML
200 INJECTION, SOLUTION INTRAVENOUS EVERY 24 HOURS
Status: DISCONTINUED | OUTPATIENT
Start: 2021-06-30 | End: 2021-06-30 | Stop reason: ALTCHOICE

## 2021-06-30 RX ORDER — SODIUM CHLORIDE 0.9 % (FLUSH) 0.9 %
10 SYRINGE (ML) INJECTION
Status: COMPLETED | OUTPATIENT
Start: 2021-06-30 | End: 2021-06-30

## 2021-06-30 RX ORDER — HYDROMORPHONE HYDROCHLORIDE 1 MG/ML
0.5 INJECTION, SOLUTION INTRAMUSCULAR; INTRAVENOUS; SUBCUTANEOUS ONCE
Status: COMPLETED | OUTPATIENT
Start: 2021-06-30 | End: 2021-06-30

## 2021-06-30 RX ORDER — FLUCONAZOLE 100 MG/1
200 TABLET ORAL DAILY
Status: DISCONTINUED | OUTPATIENT
Start: 2021-07-01 | End: 2021-07-02 | Stop reason: HOSPADM

## 2021-06-30 RX ADMIN — VANCOMYCIN HYDROCHLORIDE 1250 MG: 10 INJECTION, POWDER, LYOPHILIZED, FOR SOLUTION INTRAVENOUS at 04:36

## 2021-06-30 RX ADMIN — Medication 10 ML: at 08:13

## 2021-06-30 RX ADMIN — VANCOMYCIN HYDROCHLORIDE 1250 MG: 10 INJECTION, POWDER, LYOPHILIZED, FOR SOLUTION INTRAVENOUS at 15:14

## 2021-06-30 RX ADMIN — HYDROCODONE BITARTRATE AND ACETAMINOPHEN 1 TABLET: 5; 325 TABLET ORAL at 04:34

## 2021-06-30 RX ADMIN — ENOXAPARIN SODIUM 40 MG: 40 INJECTION SUBCUTANEOUS at 08:55

## 2021-06-30 RX ADMIN — MORPHINE SULFATE 2 MG: 2 INJECTION, SOLUTION INTRAMUSCULAR; INTRAVENOUS at 05:33

## 2021-06-30 RX ADMIN — MORPHINE SULFATE 2 MG: 2 INJECTION, SOLUTION INTRAMUSCULAR; INTRAVENOUS at 16:37

## 2021-06-30 RX ADMIN — PIPERACILLIN AND TAZOBACTAM 3.38 G: 3; .375 INJECTION, POWDER, LYOPHILIZED, FOR SOLUTION INTRAVENOUS at 22:57

## 2021-06-30 RX ADMIN — HYDROCODONE BITARTRATE AND ACETAMINOPHEN 1 TABLET: 5; 325 TABLET ORAL at 22:58

## 2021-06-30 RX ADMIN — PIPERACILLIN AND TAZOBACTAM 3.38 G: 3; .375 INJECTION, POWDER, LYOPHILIZED, FOR SOLUTION INTRAVENOUS at 15:14

## 2021-06-30 RX ADMIN — FLUCONAZOLE 200 MG: 200 INJECTION, SOLUTION INTRAVENOUS at 08:55

## 2021-06-30 RX ADMIN — Medication 10 ML: at 06:25

## 2021-06-30 RX ADMIN — ONDANSETRON 4 MG: 4 TABLET, ORALLY DISINTEGRATING ORAL at 11:00

## 2021-06-30 RX ADMIN — HYDROCODONE BITARTRATE AND ACETAMINOPHEN 1 TABLET: 5; 325 TABLET ORAL at 15:39

## 2021-06-30 RX ADMIN — HYDROMORPHONE HYDROCHLORIDE 0.5 MG: 1 INJECTION, SOLUTION INTRAMUSCULAR; INTRAVENOUS; SUBCUTANEOUS at 07:00

## 2021-06-30 RX ADMIN — PIPERACILLIN AND TAZOBACTAM 3.38 G: 3; .375 INJECTION, POWDER, LYOPHILIZED, FOR SOLUTION INTRAVENOUS at 06:43

## 2021-06-30 RX ADMIN — GADOTERIDOL 17 ML: 279.3 INJECTION, SOLUTION INTRAVENOUS at 08:13

## 2021-06-30 NOTE — PROGRESS NOTES
Problem: Falls - Risk of  Goal: *Absence of Falls  Description: Document Moy Sites Fall Risk and appropriate interventions in the flowsheet.   Outcome: Progressing Towards Goal  Note: Fall Risk Interventions:  Mobility Interventions: Bed/chair exit alarm    Mentation Interventions: Bed/chair exit alarm    Medication Interventions: Bed/chair exit alarm

## 2021-06-30 NOTE — PROGRESS NOTES
Infectious Disease Consult    Today's Date: 6/30/2021   Admit Date: 6/28/2021    Impression:   · L foot infection - Primary infection was tinea pedis, and now with secondary infection - polymicrobial per wound culture with strep and staph so far    Plan:   ·  I agree with vanc until we know what the SA is?   · I would think you could de-escalate from zosyn to rocephin or unasyn   · She works long hours at a [employer] and must wear steel-toe boots. We discussed trying to keep her feet dry while at work. Powders may help this. · She should f/u with her podiatrist next week? · Recommend screening for HIV just in case  · I hope she can switch to oral soon, and the antibiotics may only be necessary for a week? Anti-infectives:   · Vanc 6/29 -  · Zosyn 6/29 -  · ? One dose of clinda    Subjective:   Date of Consultation:  June 30, 2021  Referring Physician: Dr. Marilee Denney    Patient is a 42 y.o. female admitted for L foot pain and drainage that has worsened over the past few weeks. This started with tinea pedis and she saw her podiatrist and was given Naftin 2% cream. She has taken this for ~ 1 week, but worsened. In the ED she did not meet SIRS criteria. Cultures are noted below and MRI of the foot was negative for abscess and osteo. She denies f/c/s, n/v/d, but her foot still hurts. Patient Active Problem List   Diagnosis Code    Left foot infection L08.9     History reviewed. No pertinent past medical history. History reviewed. No pertinent family history. Social History     Tobacco Use    Smoking status: Never Smoker    Smokeless tobacco: Never Used   Substance Use Topics    Alcohol use: Not Currently     History reviewed. No pertinent surgical history. Prior to Admission medications    Not on File       No Known Allergies     Review of Systems:  A comprehensive review of systems was negative except for that written in the History of Present Illness.     Objective:     Visit Vitals  BP (!) 92/58 (BP 1 Location: Left upper arm, BP Patient Position: At rest)   Pulse 71   Temp 98.6 °F (37 °C)   Resp 16   Ht 5' 6\" (1.676 m)   Wt 82 kg (180 lb 12.4 oz)   SpO2 99%   BMI 29.18 kg/m²     Temp (24hrs), Av.4 °F (36.9 °C), Min:98.1 °F (36.7 °C), Max:98.6 °F (37 °C)       Lines:  Peripheral IV:       Physical Exam:    General:  Alert, cooperative, well noursished, well developed, appears stated age   Eyes:  Sclera anicteric. Pupils equally round and reactive to light. Neck: Supple   Lungs:   Clear to auscultation bilaterally, good effort   CV:  Regular rate and rhythm,no murmur, click, rub or gallop   Abdomen:   Soft, non-tender. bowel sounds normal. non-distended   Extremities: No cyanosis or edema. Her L foot has tinea pedis with excoriated skin that is thickened with some satellite changes. In the midst of this is some mild erythema and multiple small bullae that are non-hemorrhagic.     Skin: As above, o/w Skin color, texture, turgor normal. no acute rash or lesions   Musculoskeletal: No swelling or deformity   Lines/Devices:  Intact, no erythema, drainage or tenderness   Psych: Alert and oriented, normal mood affect given the setting       Data Review:     CBC:  Recent Labs     21  0645 21  0537 21  0509   WBC 6.0 6.0 8.8   GRANS  --   --  65   MONOS  --   --  11   EOS  --   --  4   ANEU  --   --  5.7   ABL  --   --  1.7   HGB 12.5 11.8 12.8   HCT 37.1 35.2* 37.5    218 229       BMP:  Recent Labs     21  0645 21  0537 21  0509   CREA 0.58* 0.55* 0.66   BUN 9 10 8    140 139   K 3.1* 3.8 3.5   * 111* 107   CO2 25 23 23   AGAP 6* 6* 9   GLU 93 70 88       LFTS:  Recent Labs     06/28/21  0509   TBILI 0.4   ALT 16   AP 73   TP 8.3*   ALB 3.9       Microbiology:     All Micro Results     Procedure Component Value Units Date/Time    CULTURE, BLOOD [[id number]] Collected: 21    Order Status: Completed Specimen: Blood Updated: 06/30/21 1206     Special Requests: --        LEFT  Antecubital       Culture result: NO GROWTH 2 DAYS       CULTURE, BLOOD [[id number]] Collected: 06/28/21 0532    Order Status: Completed Specimen: Blood Updated: 06/30/21 1206     Special Requests: --        RIGHT  Antecubital       Culture result: NO GROWTH 2 DAYS       CULTURE, WOUND Letitia Yessica STAIN [[id number]]  (Abnormal) Collected: 06/28/21 0509    Order Status: Completed Specimen: Foot Updated: 06/30/21 0918     Special Requests: NO SPECIAL REQUESTS        GRAM STAIN 0 TO 4 WBC'S/OIF            MANY GRAM POS COCCI IN CHAINS            FEW GRAM NEGATIVE RODS        Culture result:       HEAVY STREPTOCOCCI, BETA HEMOLYTIC GROUP G THIS ORGANISM WILL BE HELD FOR 7 DAYS.  IF FURTHER TESTING IS REQUIRED PLEASE NOTIFY MICROBIOLOGY                  MODERATE STAPHYLOCOCCUS AUREUS SENSITIVITY TO FOLLOW                  CULTURE IN [address] UPDATES TO FOLLOW                Imaging:   Reviewed by me    Signed By: Frida Garcia MD     June 30, 2021

## 2021-06-30 NOTE — PROGRESS NOTES
Subsequent Care Note 3 [facility]    Patient ID:  Name: Hany Long  MRN: [id number]  AGE: 42 y.o.  : 1975           Admit Date: 2021  Admit Diagnosis: Left foot infection [L08.9]       Principle Problem: Left foot infection. Subjective: Doing ok, pain is somewhat improved but her toes are . The swelling has improved some, No new complaints, No SOB, No Chest Pain, No Nausea or Vomiting     Review of Systems:  Pertinent items are noted in HPI.       ALLERGIES: No Known Allergies     Patient Medications    Current Facility-Administered Medications   Medication Dose Route Frequency    HYDROmorphone (DILAUDID) injection 0.5 mg  0.5 mg IntraVENous ONCE    [START ON 2021] fluconazole (DIFLUCAN) tablet 200 mg  200 mg Oral DAILY    vancomycin (VANCOCIN) 1250 mg in  ml infusion  1,250 mg IntraVENous Q12H    sodium chloride (NS) flush 5-40 mL  5-40 mL IntraVENous Q8H    sodium chloride (NS) flush 5-40 mL  5-40 mL IntraVENous PRN    acetaminophen (TYLENOL) tablet 650 mg  650 mg Oral Q6H PRN    Or    acetaminophen (TYLENOL) suppository 650 mg  650 mg Rectal Q6H PRN    polyethylene glycol (MIRALAX) packet 17 g  17 g Oral DAILY PRN    ondansetron (ZOFRAN ODT) tablet 4 mg  4 mg Oral Q8H PRN    Or    ondansetron (ZOFRAN) injection 4 mg  4 mg IntraVENous Q6H PRN    enoxaparin (LOVENOX) injection 40 mg  40 mg SubCUTAneous DAILY    piperacillin-tazobactam (ZOSYN) 3.375 g in 0.9% sodium chloride (MBP/ADV) 100 mL MBP  3.375 g IntraVENous Q8H    HYDROcodone-acetaminophen (NORCO) 5-325 mg per tablet 1 Tablet  1 Tablet Oral Q4H PRN    morphine injection 2 mg  2 mg IntraVENous Q4H PRN           Patient Vitals for the past 8 hrs:   BP Temp Pulse Resp SpO2   21 0843 125/80 98.3 °F (36.8 °C) 75 16 100 %   21 0540 (!) 159/82 98.4 °F (36.9 °C) 69 16 98 %       Physical Exam:      General: NAD, Alert, Oriented x 3   Lungs: Respirations even and unlabored, Breath Sounds were clear, no respiratory distress   Vascular: Distal pulses intact, good capillary refill   Skin: cellulitis and intertrigo of the left forefoot. Musculoskeletal: exam of both lower extremities reveal Normal painfree ROM of both ankles. Swelling improved in the left foot. Lymphatic: No lympahdenopathy, No distal edema   Neuro: No gross deficits          Lab Results   Component Value Date/Time    HGB 12.5 06/30/2021 06:45 AM       Assessment / Plan :  Patient Active Problem List   Diagnosis Code    Left foot infection L08.9          MRI results were discussed with the patient. There does not appear to be an abscess. No signs of tenosynovitis. No orthopedic intervention indicated at this time. We recommend evaluation by dermatology if this does not resolve with current medical treatment. Will be available if needed but will sign off at this time.               Signed By: JENARO Pop  6/30/2021,  11:59 AM

## 2021-06-30 NOTE — PROGRESS NOTES
Shift assessment complete. Pt resting in bed. A&Ox4. IV capped and patent. Pt denies pain at this time. Bed in lowest position, call light within reach, side rails x3. Encouraged to call for help when needed.

## 2021-06-30 NOTE — PROGRESS NOTES
RN called with report of patient having diffuse pain. Patient says \"whole body hurts\". She is tearful and grabbing her left foot when I walked into room. Left foot with macerated skin of dorsum foot and between toes with purulent drainage on dressing. Foot warm with pedal pulses. MRI pending this AM.  One does of lopez now  Add IV diflucan for concerns of extensive fungal involvement  Consult ID (family has called last night and requested this of the RN).

## 2021-06-30 NOTE — PROGRESS NOTES
Sister of patient in [place], also a nurse called regarding concerns related to MRI of R foot not completed. Reviewed MRI screening form and made a copy and walked down to CT and handed to technician, Darryl. He stated he will place in MRI and notify them tomorrow of completed screening form. Sister also had concerns regarding an infectious disease consult. Spoke with Dr. Elisabeth Bowers, hospitalist on call regarding family concerns. No orders received at this time regarding new consults. Current WBC level is 6.0. Awaiting sensitivity results from Gram stain. Patient is resting quietly, no distress noted.

## 2021-06-30 NOTE — PROGRESS NOTES
Notified Dr. Jason Culver of increased pain in neck and head. Unlike patient to feel intense pain and tearful. Both Norco and IV pain medication did not help with pain level.

## 2021-06-30 NOTE — PROGRESS NOTES
Progress Note    Patient: Evi Mail MRN: [id number]  SSN: [id number]    YOB: 1975  Age: 42 y.o. Sex: female      Admit Date: 6/28/2021    LOS: 2 days     Assessment and Plan:   42 y.o. F with no reported PMH who presents to the ER with complaint of L foot pain and drainage    1. Left foot wound infection with surrounding cellulitis  - Continue zosyn and vancomycin  - Started diflucan overnight   - Follow BCx - NGTD  - Follow wound Cx - strep and staph, few GNR  - MRI of L foot without abscess or ulceration, no evidence of osteomyelitis  - Pain management   - Currently not septic  - Ortho recs appreciated   -ID consulted    DVT ppx lovenox      Subjective:   42 y.o. F with no reported PMH who presents to the ER with complaint of L foot pain and drainage. Patient seen and examined at bedside. This morning still having L foot pain. Denies chest pain, no SOB, no nausea. Objective:     Vitals:    06/30/21 0036 06/30/21 0314 06/30/21 0540 06/30/21 0843   BP: 109/71 112/71 (!) 159/82 125/80   Pulse: 72 67 69 75   Resp: 16 16 16 16   Temp: 98.3 °F (36.8 °C) 98.6 °F (37 °C) 98.4 °F (36.9 °C) 98.3 °F (36.8 °C)   SpO2: 98% 96% 98% 100%   Weight:       Height:            Intake and Output:  Current Shift: No intake/output data recorded.   Last three shifts: 06/28 1901 - 06/30 0700  In: 360 [P.O.:360]  Out: -     ROS  10 ROS negative except from stated on subjective    Physical Exam:   General: Alert, oriented, NAD  HEENT: NC/AT, EOM are intact  Neck: supple, no JVD  Cardiovascular: RRR, S1, S2, no murmurs  Respiratory: Lungs are clear, no wheezes or rales  Abdomen: Soft, NT, ND  Back: No CVA tenderness, no paraspinal tenderness  Extremities: Left foot edema and erythema  Neuro: A&O, CN are intact, no focal deficits  Skin: no rash or ulcers  Psych: good mood and affect    Lab/Data Review:  I have personally reviewed patients laboratory data showing  Recent Results (from the past 24 hour(s))   CBC W/O DIFF    Collection Time: 06/30/21  6:45 AM   Result Value Ref Range    WBC 6.0 4.3 - 11.1 K/uL    RBC 4.35 4.05 - 5.2 M/uL    HGB 12.5 11.7 - 15.4 g/dL    HCT 37.1 35.8 - 46.3 %    MCV 85.3 79.6 - 97.8 FL    MCH 28.7 26.1 - 32.9 PG    MCHC 33.7 31.4 - 35.0 g/dL    RDW 12.4 11.9 - 14.6 %    PLATELET 028 961 - 589 K/uL    MPV 8.3 (L) 9.4 - 12.3 FL    ABSOLUTE NRBC 0.00 0.0 - 0.2 K/uL   METABOLIC PANEL, BASIC    Collection Time: 06/30/21  6:45 AM   Result Value Ref Range    Sodium 139 136 - 145 mmol/L    Potassium 3.1 (L) 3.5 - 5.1 mmol/L    Chloride 108 (H) 98 - 107 mmol/L    CO2 25 21 - 32 mmol/L    Anion gap 6 (L) 7 - 16 mmol/L    Glucose 93 65 - 100 mg/dL    BUN 9 6 - 23 MG/DL    Creatinine 0.58 (L) 0.6 - 1.0 MG/DL    GFR est AA >60 >60 ml/min/1.73m2    GFR est non-AA >60 >60 ml/min/1.73m2    Calcium 8.7 8.3 - 10.4 MG/DL      All Micro Results     Procedure Component Value Units Date/Time    CULTURE, Lopez Andie STAIN [[id number]]  (Abnormal) Collected: 06/28/21 0509    Order Status: Completed Specimen: Foot Updated: 06/30/21 0918     Special Requests: NO SPECIAL REQUESTS        GRAM STAIN 0 TO 4 WBC'S/OIF            MANY GRAM POS COCCI IN CHAINS            FEW GRAM NEGATIVE RODS        Culture result:       HEAVY STREPTOCOCCI, BETA HEMOLYTIC GROUP G THIS ORGANISM WILL BE HELD FOR 7 DAYS.  IF FURTHER TESTING IS REQUIRED PLEASE NOTIFY MICROBIOLOGY                  MODERATE STAPHYLOCOCCUS AUREUS SENSITIVITY TO FOLLOW                  CULTURE IN PROGRESS,FURTHER UPDATES TO FOLLOW          CULTURE, BLOOD [[id number]] Collected: 06/28/21 0509    Order Status: Completed Specimen: Blood Updated: 06/29/21 0709     Special Requests: --        LEFT  Antecubital       Culture result: NO GROWTH 1 DAY       CULTURE, BLOOD [[id number]] Collected: 06/28/21 0532    Order Status: Completed Specimen: Blood Updated: 06/29/21 0709     Special Requests: --        RIGHT  Antecubital       Culture result: NO GROWTH 1 DAY Image:  I have personally reviewed patients imaging showing  MRI FOOT LT W WO CONT   Final Result   1. No soft tissue abscess or ulceration evident. No evidence of osteomyelitis. 2. Soft tissue edema along the plantar aspect of the second and third toes and   of the great toe with soft tissue enhancement of the plantar aspect second and   third toes, possible cellulitis. XR FOOT LT MIN 3 V   Final Result   No evidence of acute fracture or dislocation within the left foot.               Hospital problems     Principal Problem:    Left foot infection (6/28/2021)        Signed By: Tsering Blum MD     June 30, 2021

## 2021-07-01 LAB
ANION GAP SERPL CALC-SCNC: 5 MMOL/L (ref 7–16)
BACTERIA SPEC CULT: ABNORMAL
BACTERIA SPEC CULT: ABNORMAL
BASOPHILS # BLD: 0 K/UL (ref 0–0.2)
BASOPHILS NFR BLD: 1 % (ref 0–2)
BUN SERPL-MCNC: 7 MG/DL (ref 6–23)
CALCIUM SERPL-MCNC: 8.3 MG/DL (ref 8.3–10.4)
CHLORIDE SERPL-SCNC: 110 MMOL/L (ref 98–107)
CO2 SERPL-SCNC: 26 MMOL/L (ref 21–32)
CREAT SERPL-MCNC: 0.5 MG/DL (ref 0.6–1)
DIFFERENTIAL METHOD BLD: ABNORMAL
EOSINOPHIL # BLD: 0.4 K/UL (ref 0–0.8)
EOSINOPHIL NFR BLD: 6 % (ref 0.5–7.8)
ERYTHROCYTE [DISTWIDTH] IN BLOOD BY AUTOMATED COUNT: 12.3 % (ref 11.9–14.6)
GLUCOSE SERPL-MCNC: 81 MG/DL (ref 65–100)
GRAM STN SPEC: ABNORMAL
HCT VFR BLD AUTO: 34.2 % (ref 35.8–46.3)
HGB BLD-MCNC: 11.6 G/DL (ref 11.7–15.4)
IMM GRANULOCYTES # BLD AUTO: 0 K/UL (ref 0–0.5)
IMM GRANULOCYTES NFR BLD AUTO: 0 % (ref 0–5)
LYMPHOCYTES # BLD: 1.9 K/UL (ref 0.5–4.6)
LYMPHOCYTES NFR BLD: 30 % (ref 13–44)
MCH RBC QN AUTO: 29.1 PG (ref 26.1–32.9)
MCHC RBC AUTO-ENTMCNC: 33.9 G/DL (ref 31.4–35)
MCV RBC AUTO: 85.7 FL (ref 79.6–97.8)
MONOCYTES # BLD: 0.6 K/UL (ref 0.1–1.3)
MONOCYTES NFR BLD: 9 % (ref 4–12)
NEUTS SEG # BLD: 3.4 K/UL (ref 1.7–8.2)
NEUTS SEG NFR BLD: 54 % (ref 43–78)
NRBC # BLD: 0 K/UL (ref 0–0.2)
PLATELET # BLD AUTO: 224 K/UL (ref 150–450)
PMV BLD AUTO: 8.3 FL (ref 9.4–12.3)
POTASSIUM SERPL-SCNC: 3.6 MMOL/L (ref 3.5–5.1)
RBC # BLD AUTO: 3.99 M/UL (ref 4.05–5.2)
SERVICE CMNT-IMP: ABNORMAL
SODIUM SERPL-SCNC: 141 MMOL/L (ref 136–145)
VANCOMYCIN TROUGH SERPL-MCNC: 9.7 UG/ML (ref 5–20)
WBC # BLD AUTO: 6.3 K/UL (ref 4.3–11.1)

## 2021-07-01 PROCEDURE — 2709999900 HC NON-CHARGEABLE SUPPLY

## 2021-07-01 PROCEDURE — 74011250637 HC RX REV CODE- 250/637: Performed by: INTERNAL MEDICINE

## 2021-07-01 PROCEDURE — 65270000029 HC RM PRIVATE

## 2021-07-01 PROCEDURE — 74011250636 HC RX REV CODE- 250/636: Performed by: INTERNAL MEDICINE

## 2021-07-01 PROCEDURE — 74011000250 HC RX REV CODE- 250: Performed by: INTERNAL MEDICINE

## 2021-07-01 PROCEDURE — 74011000258 HC RX REV CODE- 258: Performed by: FAMILY MEDICINE

## 2021-07-01 PROCEDURE — 74011250636 HC RX REV CODE- 250/636: Performed by: FAMILY MEDICINE

## 2021-07-01 PROCEDURE — 85025 COMPLETE CBC W/AUTO DIFF WBC: CPT

## 2021-07-01 PROCEDURE — 36415 COLL VENOUS BLD VENIPUNCTURE: CPT

## 2021-07-01 PROCEDURE — 74011250637 HC RX REV CODE- 250/637: Performed by: FAMILY MEDICINE

## 2021-07-01 PROCEDURE — 80048 BASIC METABOLIC PNL TOTAL CA: CPT

## 2021-07-01 PROCEDURE — 80202 ASSAY OF VANCOMYCIN: CPT

## 2021-07-01 RX ORDER — CEFAZOLIN SODIUM/WATER 2 G/20 ML
2 SYRINGE (ML) INTRAVENOUS EVERY 8 HOURS
Status: DISCONTINUED | OUTPATIENT
Start: 2021-07-01 | End: 2021-07-02

## 2021-07-01 RX ORDER — HYDROCODONE BITARTRATE AND ACETAMINOPHEN 5; 325 MG/1; MG/1
1 TABLET ORAL EVERY 6 HOURS
Status: DISCONTINUED | OUTPATIENT
Start: 2021-07-01 | End: 2021-07-02 | Stop reason: HOSPADM

## 2021-07-01 RX ADMIN — Medication 10 ML: at 23:30

## 2021-07-01 RX ADMIN — ONDANSETRON 4 MG: 4 TABLET, ORALLY DISINTEGRATING ORAL at 07:36

## 2021-07-01 RX ADMIN — MORPHINE SULFATE 2 MG: 2 INJECTION, SOLUTION INTRAMUSCULAR; INTRAVENOUS at 05:21

## 2021-07-01 RX ADMIN — HYDROCODONE BITARTRATE AND ACETAMINOPHEN 1 TABLET: 5; 325 TABLET ORAL at 10:44

## 2021-07-01 RX ADMIN — Medication 10 ML: at 15:53

## 2021-07-01 RX ADMIN — CEFAZOLIN SODIUM 2 G: 100 INJECTION, POWDER, LYOPHILIZED, FOR SOLUTION INTRAVENOUS at 15:47

## 2021-07-01 RX ADMIN — MORPHINE SULFATE 2 MG: 2 INJECTION, SOLUTION INTRAMUSCULAR; INTRAVENOUS at 11:18

## 2021-07-01 RX ADMIN — PIPERACILLIN AND TAZOBACTAM 3.38 G: 3; .375 INJECTION, POWDER, LYOPHILIZED, FOR SOLUTION INTRAVENOUS at 05:21

## 2021-07-01 RX ADMIN — VANCOMYCIN HYDROCHLORIDE 1250 MG: 10 INJECTION, POWDER, LYOPHILIZED, FOR SOLUTION INTRAVENOUS at 03:21

## 2021-07-01 RX ADMIN — VANCOMYCIN HYDROCHLORIDE 1000 MG: 1 INJECTION, POWDER, LYOPHILIZED, FOR SOLUTION INTRAVENOUS at 10:00

## 2021-07-01 RX ADMIN — ENOXAPARIN SODIUM 40 MG: 40 INJECTION SUBCUTANEOUS at 10:00

## 2021-07-01 RX ADMIN — Medication 10 ML: at 10:08

## 2021-07-01 RX ADMIN — CEFAZOLIN SODIUM 2 G: 100 INJECTION, POWDER, LYOPHILIZED, FOR SOLUTION INTRAVENOUS at 23:29

## 2021-07-01 RX ADMIN — HYDROCODONE BITARTRATE AND ACETAMINOPHEN 1 TABLET: 5; 325 TABLET ORAL at 04:30

## 2021-07-01 RX ADMIN — FLUCONAZOLE 200 MG: 100 TABLET ORAL at 10:01

## 2021-07-01 RX ADMIN — MORPHINE SULFATE 2 MG: 2 INJECTION, SOLUTION INTRAMUSCULAR; INTRAVENOUS at 01:18

## 2021-07-01 NOTE — PROGRESS NOTES
Infectious Disease Consult    Today's Date: 2021   Admit Date: 2021    Impression:   · L foot infection - Primary infection was tinea pedis, and now with secondary infection - polymicrobial per wound culture with MSSA and GGS    Plan:   · Dc vanc/zosyn  · Change to ancef for now  · When ready to discharge, ok to change to keflex 500 QID to complete 7 days, up to 10 total days based on appearance at discharge  · Discussed ongoing skin care with her given sloughing and potential for repeat infection    Thank you for allowing us to participate in the care of this patient, ID will sign off at this time. Please don't hesitate to contact us with further questions or concerns. Anti-infectives:   · Vanc   · Zosyn   · ? One dose of clinda    Subjective:   Doing well today. Still some pain in the foot but notes this Is improved from prior. Erythema resolved. Had significant ear pain that occurred during vanc infusion. Review of Systems:  A comprehensive review of systems was negative except for that written in the History of Present Illness. Objective:     Visit Vitals  /76 (BP 1 Location: Right upper arm, BP Patient Position: At rest)   Pulse 72   Temp 98.4 °F (36.9 °C)   Resp 18   Ht 5' 6\" (1.676 m)   Wt 82 kg (180 lb 12.4 oz)   SpO2 99%   BMI 29.18 kg/m²     Temp (24hrs), Av.4 °F (36.9 °C), Min:98.2 °F (36.8 °C), Max:98.6 °F (37 °C)       Lines:  Peripheral IV:       Physical Exam:    General:  NAD, laying in bed   Eyes:  No icterus or drainage. Lungs:   Normal WOB on RA       Abdomen:   Soft,  non-distended   Extremities: No edema.  LLE with large blister on plantar surface of foot, clear drainage, blistering over dorsum, prior erythema resolved   Skin: As above, o/w Skin color, texture, turgor normal. no acute rash or lesions   Musculoskeletal: No swelling or deformity   Lines/Devices:  Intact, no erythema, drainage or tenderness   Psych: Alert and oriented, normal mood affect given the setting       Data Review:     CBC:  Recent Labs     07/01/21 0206 06/30/21 0645 06/29/21 0537   WBC 6.3 6.0 6.0   GRANS 54  --   --    MONOS 9  --   --    EOS 6  --   --    ANEU 3.4  --   --    ABL 1.9  --   --    HGB 11.6* 12.5 11.8   HCT 34.2* 37.1 35.2*    222 218       BMP:  Recent Labs     07/01/21 0206 06/30/21 0645 06/29/21  0537   CREA 0.50* 0.58* 0.55*   BUN 7 9 10    139 140   K 3.6 3.1* 3.8   * 108* 111*   CO2 26 25 23   AGAP 5* 6* 6*   GLU 81 93 70       LFTS:  No results for input(s): TBILI, ALT, AP, TP, ALB in the last 72 hours. No lab exists for component: SGOT    Microbiology:     All Micro Results     Procedure Component Value Units Date/Time    CULTURE, Elias Ch STAIN [[id number]]  (Abnormal)  (Susceptibility) Collected: 06/28/21 0509    Order Status: Completed Specimen: Foot Updated: 07/01/21 0701     Special Requests: NO SPECIAL REQUESTS        GRAM STAIN 0 TO 4 WBC'S/OIF            MANY GRAM POS COCCI IN CHAINS            FEW GRAM NEGATIVE RODS        Culture result:       HEAVY STREPTOCOCCI, BETA HEMOLYTIC GROUP G THIS ORGANISM WILL BE HELD FOR 7 DAYS.  IF FURTHER TESTING IS REQUIRED PLEASE NOTIFY MICROBIOLOGY                  MODERATE STAPHYLOCOCCUS AUREUS          CULTURE, BLOOD [[id number]] Collected: 06/28/21 0509    Order Status: Completed Specimen: Blood Updated: 07/01/21 0700     Special Requests: --        LEFT  Antecubital       Culture result: NO GROWTH 3 DAYS       CULTURE, BLOOD [[id number]] Collected: 06/28/21 0532    Order Status: Completed Specimen: Blood Updated: 07/01/21 0700     Special Requests: --        RIGHT  Antecubital       Culture result: NO GROWTH 3 DAYS             Imaging:   Reviewed by me    Signed By: Alexandria Stephens MD     July 1, 2021

## 2021-07-01 NOTE — PROGRESS NOTES
Progress Note    Patient: Edelmiro Schlatter MRN: [id number]  SSN: [id number]    YOB: 1975  Age: 42 y.o. Sex: female      Admit Date: 6/28/2021    LOS: 3 days     Assessment and Plan:   42 y.o. F with no reported PMH who is admitted with left foot wound infection. 1. Left foot wound infection with surrounding cellulitis  - Wound culture positive for staph, pansensitive  - Follow BCx - NGTD  - MRI of L foot without abscess or ulceration, no evidence of osteomyelitis  - Discontinue Zosyn and vancomycin and start patient on Ancef as per ID recommendation  - Started diflucan 6/30  - Pain management, schedule patient on Norco every 6 hours, morphine 2 mg IV PRN for breakthrough pain control  - Currently not septic  - Ortho recs appreciated   - ID consulted, appreciate recommendation  - Wound care    DVT ppx lovenox      Subjective:   42 y.o. F with no reported PMH who is admitted with left foot wound infection. 7/1: Patient seen and examined at bedside. Complaining of left foot pain but reports improved. Overall feels better. Denies chest pain, no SOB, no nausea. Eating and tolerating. Patient complaining of ear pain and tinnitus and thinks it is due to vancomycin. Family present at the bedside, updated on patient's current condition. Family is concerned about pain management. All questions were answered    Objective:     Vitals:    06/30/21 1542 06/30/21 1930 06/30/21 2306 07/01/21 0330   BP: 128/85 100/60 108/62 (!) 103/59   Pulse: (!) 101 78 68 65   Resp: 16 16 16 16   Temp: 98.4 °F (36.9 °C) 98.3 °F (36.8 °C) 98.2 °F (36.8 °C) 98.6 °F (37 °C)   SpO2: 97% 95% 98% 100%   Weight:       Height:            Intake and Output:  Current Shift: No intake/output data recorded. Last three shifts: No intake/output data recorded.     ROS  10 ROS negative except from stated on subjective    Physical Exam:   General: Alert, oriented, NAD  HEENT: NC/AT, EOM are intact  Neck: supple, no JVD  Cardiovascular: RRR, S1, S2, no murmurs  Respiratory: Lungs are clear, no wheezes or rales  Abdomen: Soft, NT, ND  Back: No CVA tenderness, no paraspinal tenderness  Extremities: Left foot cellulitis, dressing dry intact  Neuro: A&O, CN are intact, no focal deficits  Skin: no rash or ulcers  Psych: good mood and affect    Lab/Data Review:  I have personally reviewed patients laboratory data showing  Recent Results (from the past 24 hour(s))   VANCOMYCIN, TROUGH    Collection Time: 07/01/21  2:06 AM   Result Value Ref Range    Vancomycin,trough 9.7 5 - 20 ug/mL   CBC WITH AUTOMATED DIFF    Collection Time: 07/01/21  2:06 AM   Result Value Ref Range    WBC 6.3 4.3 - 11.1 K/uL    RBC 3.99 (L) 4.05 - 5.2 M/uL    HGB 11.6 (L) 11.7 - 15.4 g/dL    HCT 34.2 (L) 35.8 - 46.3 %    MCV 85.7 79.6 - 97.8 FL    MCH 29.1 26.1 - 32.9 PG    MCHC 33.9 31.4 - 35.0 g/dL    RDW 12.3 11.9 - 14.6 %    PLATELET 799 146 - 993 K/uL    MPV 8.3 (L) 9.4 - 12.3 FL    ABSOLUTE NRBC 0.00 0.0 - 0.2 K/uL    DF AUTOMATED      NEUTROPHILS 54 43 - 78 %    LYMPHOCYTES 30 13 - 44 %    MONOCYTES 9 4.0 - 12.0 %    EOSINOPHILS 6 0.5 - 7.8 %    BASOPHILS 1 0.0 - 2.0 %    IMMATURE GRANULOCYTES 0 0.0 - 5.0 %    ABS. NEUTROPHILS 3.4 1.7 - 8.2 K/UL    ABS. LYMPHOCYTES 1.9 0.5 - 4.6 K/UL    ABS. MONOCYTES 0.6 0.1 - 1.3 K/UL    ABS. EOSINOPHILS 0.4 0.0 - 0.8 K/UL    ABS. BASOPHILS 0.0 0.0 - 0.2 K/UL    ABS. IMM.  GRANS. 0.0 0.0 - 0.5 K/UL   METABOLIC PANEL, BASIC    Collection Time: 07/01/21  2:06 AM   Result Value Ref Range    Sodium 141 136 - 145 mmol/L    Potassium 3.6 3.5 - 5.1 mmol/L    Chloride 110 (H) 98 - 107 mmol/L    CO2 26 21 - 32 mmol/L    Anion gap 5 (L) 7 - 16 mmol/L    Glucose 81 65 - 100 mg/dL    BUN 7 6 - 23 MG/DL    Creatinine 0.50 (L) 0.6 - 1.0 MG/DL    GFR est AA >60 >60 ml/min/1.73m2    GFR est non-AA >60 >60 ml/min/1.73m2    Calcium 8.3 8.3 - 10.4 MG/DL      All Micro Results     Procedure Component Value Units Date/Time    CULTURE, WOUND W GRAM STAIN [[id number]]  (Abnormal)  (Susceptibility) Collected: 06/28/21 0509    Order Status: Completed Specimen: Foot Updated: 07/01/21 0701     Special Requests: NO SPECIAL REQUESTS        GRAM STAIN 0 TO 4 WBC'S/OIF            MANY GRAM POS COCCI IN CHAINS            FEW GRAM NEGATIVE RODS        Culture result:       HEAVY STREPTOCOCCI, BETA HEMOLYTIC GROUP G THIS ORGANISM WILL BE HELD FOR 7 DAYS. IF FURTHER TESTING IS REQUIRED PLEASE NOTIFY MICROBIOLOGY                  MODERATE STAPHYLOCOCCUS AUREUS          CULTURE, BLOOD [[id number]] Collected: 06/28/21 0509    Order Status: Completed Specimen: Blood Updated: 07/01/21 0700     Special Requests: --        LEFT  Antecubital       Culture result: NO GROWTH 3 DAYS       CULTURE, BLOOD [[id number]] Collected: 06/28/21 0532    Order Status: Completed Specimen: Blood Updated: 07/01/21 0700     Special Requests: --        RIGHT  Antecubital       Culture result: NO GROWTH 3 DAYS              Image:  I have personally reviewed patients imaging showing  MRI FOOT LT W WO CONT   Final Result   1. No soft tissue abscess or ulceration evident. No evidence of osteomyelitis. 2. Soft tissue edema along the plantar aspect of the second and third toes and   of the great toe with soft tissue enhancement of the plantar aspect second and   third toes, possible cellulitis. XR FOOT LT MIN 3 V   Final Result   No evidence of acute fracture or dislocation within the left foot.               Hospital problems     Principal Problem:    Left foot infection (6/28/2021)        Signed By: Alix Galvin MD     July 1, 2021

## 2021-07-01 NOTE — PROGRESS NOTES
Notified MD of pt co pain in ear and sore tongue and she thinks that the vanco makes her hurt all over. The vancomycin dose just finished.

## 2021-07-01 NOTE — PROGRESS NOTES
Pharmacokinetic Consult to Pharmacist    Mjkirby Morataya is a 42 y.o. female being treated for SSTI of foot with zosyn and vancomycin. Height: 5' 6\" (167.6 cm)  Weight: 82 kg (180 lb 12.4 oz)  Lab Results   Component Value Date/Time    BUN 7 07/01/2021 02:06 AM    Creatinine 0.50 (L) 07/01/2021 02:06 AM    WBC 6.3 07/01/2021 02:06 AM    Lactic acid 0.9 06/28/2021 05:09 AM      Estimated Creatinine Clearance: 110.7 mL/min (A) (by C-G formula based on SCr of 0.5 mg/dL (L)). CULTURES:  Results     Procedure Component Value Units Date/Time    CULTURE, BLOOD [[id number]] Collected: 06/28/21 0532    Order Status: Completed Specimen: Blood Updated: 07/01/21 0700     Special Requests: --        RIGHT  Antecubital       Culture result: NO GROWTH 3 DAYS       CULTURE, BLOOD [[id number]] Collected: 06/28/21 0509    Order Status: Completed Specimen: Blood Updated: 07/01/21 0700     Special Requests: --        LEFT  Antecubital       Culture result: NO GROWTH 3 DAYS       CULTURE, WOUND Santosh Stratford STAIN [[id number]]  (Abnormal)  (Susceptibility) Collected: 06/28/21 0509    Order Status: Completed Specimen: Foot Updated: 07/01/21 0701     Special Requests: NO SPECIAL REQUESTS        GRAM STAIN 0 TO 4 WBC'S/OIF            MANY GRAM POS COCCI IN CHAINS            FEW GRAM NEGATIVE RODS        Culture result:       HEAVY STREPTOCOCCI, BETA HEMOLYTIC GROUP G THIS ORGANISM WILL BE HELD FOR 7 DAYS. IF FURTHER TESTING IS REQUIRED PLEASE NOTIFY MICROBIOLOGY                  MODERATE STAPHYLOCOCCUS AUREUS          Susceptibility      Staphylococcus aureus      KRUNAL      Cefazolin ($) Susceptible      Clindamycin ($) Susceptible      Oxacillin Susceptible      Rifampin ($$$$) Susceptible  [1]       Tetracycline Susceptible      Trimeth-Sulfamethoxa Susceptible      Vancomycin ($) Susceptible              [1]  Rifampin is not to be used for mono-therapy.           Linear View                           Lab Results   Component Value Date/Time Vancomycin,trough 9.7 07/01/2021 02:06 AM       Day 3 of vancomycin. Goal trough is 10-20. We will adjust the dose to 1g q8h with anticipated trough ~15. We will continue to follow the  patient and order levels when clinically indicated.       Thank you,  Kamron Mcginnis, PharmD

## 2021-07-02 VITALS
RESPIRATION RATE: 20 BRPM | BODY MASS INDEX: 29.05 KG/M2 | WEIGHT: 180.78 LBS | HEIGHT: 66 IN | DIASTOLIC BLOOD PRESSURE: 62 MMHG | OXYGEN SATURATION: 96 % | SYSTOLIC BLOOD PRESSURE: 113 MMHG | HEART RATE: 105 BPM | TEMPERATURE: 98.2 F

## 2021-07-02 LAB
ANION GAP SERPL CALC-SCNC: 6 MMOL/L (ref 7–16)
BASOPHILS # BLD: 0.1 K/UL (ref 0–0.2)
BASOPHILS NFR BLD: 1 % (ref 0–2)
BUN SERPL-MCNC: 8 MG/DL (ref 6–23)
CALCIUM SERPL-MCNC: 8.6 MG/DL (ref 8.3–10.4)
CHLORIDE SERPL-SCNC: 108 MMOL/L (ref 98–107)
CO2 SERPL-SCNC: 26 MMOL/L (ref 21–32)
CREAT SERPL-MCNC: 0.61 MG/DL (ref 0.6–1)
DIFFERENTIAL METHOD BLD: ABNORMAL
EOSINOPHIL # BLD: 0.5 K/UL (ref 0–0.8)
EOSINOPHIL NFR BLD: 8 % (ref 0.5–7.8)
ERYTHROCYTE [DISTWIDTH] IN BLOOD BY AUTOMATED COUNT: 12.2 % (ref 11.9–14.6)
GLUCOSE SERPL-MCNC: 84 MG/DL (ref 65–100)
HCT VFR BLD AUTO: 35.6 % (ref 35.8–46.3)
HGB BLD-MCNC: 11.9 G/DL (ref 11.7–15.4)
IMM GRANULOCYTES # BLD AUTO: 0 K/UL (ref 0–0.5)
IMM GRANULOCYTES NFR BLD AUTO: 0 % (ref 0–5)
LYMPHOCYTES # BLD: 2 K/UL (ref 0.5–4.6)
LYMPHOCYTES NFR BLD: 33 % (ref 13–44)
MCH RBC QN AUTO: 28.7 PG (ref 26.1–32.9)
MCHC RBC AUTO-ENTMCNC: 33.4 G/DL (ref 31.4–35)
MCV RBC AUTO: 85.8 FL (ref 79.6–97.8)
MONOCYTES # BLD: 0.5 K/UL (ref 0.1–1.3)
MONOCYTES NFR BLD: 9 % (ref 4–12)
NEUTS SEG # BLD: 3 K/UL (ref 1.7–8.2)
NEUTS SEG NFR BLD: 50 % (ref 43–78)
NRBC # BLD: 0 K/UL (ref 0–0.2)
PLATELET # BLD AUTO: 232 K/UL (ref 150–450)
PMV BLD AUTO: 8.3 FL (ref 9.4–12.3)
POTASSIUM SERPL-SCNC: 3.9 MMOL/L (ref 3.5–5.1)
RBC # BLD AUTO: 4.15 M/UL (ref 4.05–5.2)
SODIUM SERPL-SCNC: 140 MMOL/L (ref 136–145)
WBC # BLD AUTO: 6 K/UL (ref 4.3–11.1)

## 2021-07-02 PROCEDURE — 74011250636 HC RX REV CODE- 250/636: Performed by: FAMILY MEDICINE

## 2021-07-02 PROCEDURE — 36415 COLL VENOUS BLD VENIPUNCTURE: CPT

## 2021-07-02 PROCEDURE — 80048 BASIC METABOLIC PNL TOTAL CA: CPT

## 2021-07-02 PROCEDURE — 74011250636 HC RX REV CODE- 250/636: Performed by: INTERNAL MEDICINE

## 2021-07-02 PROCEDURE — 74011250637 HC RX REV CODE- 250/637: Performed by: NURSE PRACTITIONER

## 2021-07-02 PROCEDURE — 74011250637 HC RX REV CODE- 250/637: Performed by: INTERNAL MEDICINE

## 2021-07-02 PROCEDURE — 74011000250 HC RX REV CODE- 250: Performed by: INTERNAL MEDICINE

## 2021-07-02 PROCEDURE — 85025 COMPLETE CBC W/AUTO DIFF WBC: CPT

## 2021-07-02 RX ORDER — CEPHALEXIN 500 MG/1
500 CAPSULE ORAL 4 TIMES DAILY
Qty: 28 CAPSULE | Refills: 0 | Status: SHIPPED | OUTPATIENT
Start: 2021-07-02 | End: 2021-07-09

## 2021-07-02 RX ORDER — FLUCONAZOLE 200 MG/1
200 TABLET ORAL DAILY
Qty: 4 TABLET | Refills: 0 | Status: SHIPPED | OUTPATIENT
Start: 2021-07-03 | End: 2021-07-07

## 2021-07-02 RX ORDER — CEPHALEXIN 500 MG/1
500 CAPSULE ORAL 4 TIMES DAILY
Status: DISCONTINUED | OUTPATIENT
Start: 2021-07-02 | End: 2021-07-02 | Stop reason: HOSPADM

## 2021-07-02 RX ORDER — HYDROCODONE BITARTRATE AND ACETAMINOPHEN 5; 325 MG/1; MG/1
1 TABLET ORAL EVERY 6 HOURS
Qty: 12 TABLET | Refills: 0 | Status: SHIPPED | OUTPATIENT
Start: 2021-07-02 | End: 2021-07-05

## 2021-07-02 RX ADMIN — CEPHALEXIN 500 MG: 500 CAPSULE ORAL at 11:25

## 2021-07-02 RX ADMIN — Medication 10 ML: at 06:48

## 2021-07-02 RX ADMIN — FLUCONAZOLE 200 MG: 100 TABLET ORAL at 08:29

## 2021-07-02 RX ADMIN — Medication 10 ML: at 08:32

## 2021-07-02 RX ADMIN — ENOXAPARIN SODIUM 40 MG: 40 INJECTION SUBCUTANEOUS at 08:29

## 2021-07-02 NOTE — DISCHARGE SUMMARY
[address] [facility] Hospitalist Discharge Summary     Name:  Marylen Gasman    Age:46 y.o. Sex:female  :  1975       MRN:  [id number]       Admitting Physician: Fe Camp MD Admit Date: 2021  4:37 AM   Attending Physician: Mayank Millard DO  Primary Care Physician: None       Discharge Physician: Christal Suh NP  Discharge date: 21   Discharged Condition: Stable    Indication for Admission:   Chief Complaint   Patient presents with    Foot Pain        Reasons for hospitalization:  Hospital Problems as of 2021 Never Reviewed        Codes Class Noted - Resolved POA    * (Principal) Left foot infection ICD-10-CM: L08.9  ICD-9-CM: 686.9  2021 - Present Unknown            Discharge Diagnosis: L foot infection with MSSA and GGS  Did Patient have Sepsis (YES OR NO): No      Hospital Course/Interval history:  Ms. Fermín Smith is a 42 y.o. F with no reported PMH who presented to the ER on  with complaint of L foot pain and drainage. Denies known injury. Reported that her foot was dry over the past few weeks, and she had been seeing a foot doctor about it and was diagnosed with \"athlete's foot\" and started on a \"2% cream.\"  She had been using this foot cream and thought that it was improving until  when her foot began to peel and ooze. Upon ER workup, labs are unremarkable, but foot clinically appears very poor. Hospitalist asked to admit for IV antibiotics. ID and Ortho surgery were consulted during this admission. MRI was performed but there was no evidence of osteomyelitis; negative for abscess as well. Based on wound culture, ID provided antibiotic recommendations and Ms. Fermín Smith will complete this course at home. Blood cultures are negative x 3 days at discharge. Ms. Fermín Smith will need to follow up with PCP and podiatry within 7-10 days of discharge.   She is appropriate for discharge today, .       Assessment/Plan:  # Left foot wound infection with MSSA and GGS  - Wound culture positive for staph, pansensitive  - Follow BCx - NGTD  - MRI of L foot without abscess or ulceration, no evidence of osteomyelitis  - Discontinue Zosyn and vancomycin and start patient on Ancef as per ID recommendation  - Started diflucan 6/30  - Pain management, schedule patient on Norco every 6 hours, morphine 2 mg IV PRN for breakthrough pain control  - Currently not septic  - Ortho recs appreciated   - ID consulted, appreciate recommendation  - Wound care  Jun/02: ID and Ortho have signed off   - Foot examined; sloughing present; pain improved   - D/c with 4 more days fluconazole; 7 days of cephalexin   - May use Xeroform and twice daily gauze dressing changes   - Blood cxs NG x 3 days   - Lab work unremarkable this morning    Consults:   IP CONSULT TO ORTHOPEDIC SURGERY  IP CONSULT TO INFECTIOUS DISEASES     Disposition: Home or Self Care  Diet:   DIET ADULT  Code Status: Full Code      Follow up labs/imaging: None  Follow up with PCP and Podiatrist within a week; Physician services [phone] will call you to schedule an appointment. Pending labs/studies: Blood cultures are negative x 3 days    Current Discharge Medication List      START taking these medications    Details   cephALEXin (KEFLEX) 500 mg capsule Take 1 Capsule by mouth four (4) times daily for 7 days. Qty: 28 Capsule, Refills: 0  Start date: 7/2/2021, End date: 7/9/2021      fluconazole (DIFLUCAN) 200 mg tablet Take 1 Tablet by mouth daily for 4 days. FDA advises cautious prescribing of oral fluconazole in pregnancy. Qty: 4 Tablet, Refills: 0  Start date: 7/3/2021, End date: 7/7/2021      HYDROcodone-acetaminophen (NORCO) 5-325 mg per tablet Take 1 Tablet by mouth every six (6) hours for 3 days. Max Daily Amount: 4 Tablets.   Qty: 12 Tablet, Refills: 0  Start date: 7/2/2021, End date: 7/5/2021    Associated Diagnoses: Left foot infection             Medications Discontinued During This Encounter   Medication Reason    fluconazole (DIFLUCAN) 200mg/100 mL IVPB (premix) Alternate Therapy    vancomycin (VANCOCIN) 1250 mg in  ml infusion DOSE ADJUSTMENT    HYDROcodone-acetaminophen (NORCO) 5-325 mg per tablet 1 Tablet     piperacillin-tazobactam (ZOSYN) 3.375 g in 0.9% sodium chloride (MBP/ADV) 100 mL MBP     vancomycin (VANCOCIN) 1,000 mg in 0.9% sodium chloride 250 mL (VIAL-MATE)     ceFAZolin (ANCEF) 2 g/20 mL in sterile water IV syringe          Follow Up Orders: Follow-up Appointments   Procedures    FOLLOW UP VISIT Appointment in: Other (Specify)     Standing Status:   Standing     Number of Occurrences:   1     Order Specific Question:   Appointment in     Answer: Other (Specify)         Follow-up Information     Follow up With Specialties Details Why Contact Info    Physician Services   Physician services will call you within two days of discharge with a primary care doctors appointment [phone]  Help obtain primary care physician    None    None ([phone]) Patient stated that they have no PCP              Discharge Exam:    Patient Vitals for the past 24 hrs:   Temp Pulse Resp BP SpO2   07/02/21 0700 98.2 °F (36.8 °C) (!) 105 20 113/62 96 %   07/02/21 0314 98.8 °F (37.1 °C) 69 17 117/68 97 %   07/01/21 2250 98.5 °F (36.9 °C) 66 18 (!) 106/52 99 %   07/01/21 1951 98.4 °F (36.9 °C) 66 18 (!) 103/56 95 %   07/01/21 1519 98.1 °F (36.7 °C) (!) 59 18 (!) 107/55 100 %   07/01/21 1148 98.4 °F (36.9 °C) 72 18 119/76 99 %     Oxygen Therapy  O2 Sat (%): 96 % (07/02/21 0700)  O2 Device: None (Room air) (07/02/21 0314)    Estimated body mass index is 29.18 kg/m² as calculated from the following:    Height as of this encounter: 5' 6\" (1.676 m). Weight as of this encounter: 82 kg (180 lb 12.4 oz). No intake or output data in the 24 hours ending 07/02/21 1111    *Note that automatically entered I/Os may not be accurate; dependent on patient compliance with collection and accurate  by assistants.     Physical Exam:   General:  No acute distress, speaking in full sentences, no use of accessory muscles   HEENT:  Normocephalic, oropharynx is clear   Neck:   Supple, no JVD   Lungs:  Clear to auscultation bilaterally   CV:  Regular rate and rhythm with normal S1 and S2   Abdomen: Soft, nontender, nondistended  Extremities:  No cyanosis, L distal foot wrapped, sloughing noted  Neuro:  Nonfocal, A&O x3   Psych:  Normal affect       All Labs from Last 24 Hrs:  Recent Results (from the past 24 hour(s))   CBC WITH AUTOMATED DIFF    Collection Time: 07/02/21  4:29 AM   Result Value Ref Range    WBC 6.0 4.3 - 11.1 K/uL    RBC 4.15 4.05 - 5.2 M/uL    HGB 11.9 11.7 - 15.4 g/dL    HCT 35.6 (L) 35.8 - 46.3 %    MCV 85.8 79.6 - 97.8 FL    MCH 28.7 26.1 - 32.9 PG    MCHC 33.4 31.4 - 35.0 g/dL    RDW 12.2 11.9 - 14.6 %    PLATELET 583 941 - 591 K/uL    MPV 8.3 (L) 9.4 - 12.3 FL    ABSOLUTE NRBC 0.00 0.0 - 0.2 K/uL    DF AUTOMATED      NEUTROPHILS 50 43 - 78 %    LYMPHOCYTES 33 13 - 44 %    MONOCYTES 9 4.0 - 12.0 %    EOSINOPHILS 8 (H) 0.5 - 7.8 %    BASOPHILS 1 0.0 - 2.0 %    IMMATURE GRANULOCYTES 0 0.0 - 5.0 %    ABS. NEUTROPHILS 3.0 1.7 - 8.2 K/UL    ABS. LYMPHOCYTES 2.0 0.5 - 4.6 K/UL    ABS. MONOCYTES 0.5 0.1 - 1.3 K/UL    ABS. EOSINOPHILS 0.5 0.0 - 0.8 K/UL    ABS. BASOPHILS 0.1 0.0 - 0.2 K/UL    ABS. IMM.  GRANS. 0.0 0.0 - 0.5 K/UL   METABOLIC PANEL, BASIC    Collection Time: 07/02/21  4:29 AM   Result Value Ref Range    Sodium 140 136 - 145 mmol/L    Potassium 3.9 3.5 - 5.1 mmol/L    Chloride 108 (H) 98 - 107 mmol/L    CO2 26 21 - 32 mmol/L    Anion gap 6 (L) 7 - 16 mmol/L    Glucose 84 65 - 100 mg/dL    BUN 8 6 - 23 MG/DL    Creatinine 0.61 0.6 - 1.0 MG/DL    GFR est AA >60 >60 ml/min/1.73m2    GFR est non-AA >60 >60 ml/min/1.73m2    Calcium 8.6 8.3 - 10.4 MG/DL       All Micro Results     Procedure Component Value Units Date/Time    CULTURE, Ronita Come STAIN [[id number]]  (Abnormal)  (Susceptibility) Collected: 06/28/21 9804 Order Status: Completed Specimen: Foot Updated: 07/01/21 0701     Special Requests: NO SPECIAL REQUESTS        GRAM STAIN 0 TO 4 WBC'S/OIF            MANY GRAM POS COCCI IN CHAINS            FEW GRAM NEGATIVE RODS        Culture result:       HEAVY STREPTOCOCCI, BETA HEMOLYTIC GROUP G THIS ORGANISM WILL BE HELD FOR 7 DAYS. IF FURTHER TESTING IS REQUIRED PLEASE NOTIFY MICROBIOLOGY                  MODERATE STAPHYLOCOCCUS AUREUS          CULTURE, BLOOD [[id number]] Collected: 06/28/21 0509    Order Status: Completed Specimen: Blood Updated: 07/01/21 0700     Special Requests: --        LEFT  Antecubital       Culture result: NO GROWTH 3 DAYS       CULTURE, BLOOD [[id number]] Collected: 06/28/21 0532    Order Status: Completed Specimen: Blood Updated: 07/01/21 0700     Special Requests: --        RIGHT  Antecubital       Culture result: NO GROWTH 3 DAYS             SARS-CoV-2 Lab Results  \"Novel Coronavirus\" Test: No results found for: COV2NT   \"Emergent Disease\" Test: No results found for: EDPR  \"SARS-COV-2\" Test: No results found for: XGCOVT  Rapid Test:   No results found for: COVR         Diagnostic Imaging/Tests:   XR FOOT LT MIN 3 V    Result Date: 6/28/2021  No evidence of acute fracture or dislocation within the left foot. MRI FOOT LT W WO CONT    Result Date: 6/30/2021  1. No soft tissue abscess or ulceration evident. No evidence of osteomyelitis. 2. Soft tissue edema along the plantar aspect of the second and third toes and of the great toe with soft tissue enhancement of the plantar aspect second and third toes, possible cellulitis. Echocardiogram/EKG results:  No results found for this visit on 06/28/21. EKG Results     None          No results found for this or any previous visit. Procedures done this admission:  * No surgery found *    All VS, labs, studies, and documentation reviewed. Time spent in patient discharge planning and coordination 33 minutes.       Signed By: David Ernst NP   [employer] [facility]    July 2, 2021  11:11 AM

## 2021-07-02 NOTE — PROGRESS NOTES
Instructed pt on care of foot skin and keeping wound clean and dry and dressing change. Pt verbalized understanding.

## 2021-07-02 NOTE — DISCHARGE INSTRUCTIONS
Patient Education        Cellulitis: Care Instructions  Your Care Instructions     Cellulitis is a skin infection caused by bacteria, most often strep or staph. It often occurs after a break in the skin from a scrape, cut, bite, or puncture, or after a rash. Cellulitis may be treated without doing tests to find out what caused it. But your doctor may do tests, if needed, to look for a specific bacteria, like methicillin-resistant Staphylococcus aureus (MRSA). The doctor has checked you carefully, but problems can develop later. If you notice any problems or new symptoms, get medical treatment right away. Follow-up care is a key part of your treatment and safety. Be sure to make and go to all appointments, and call your doctor if you are having problems. It's also a good idea to know your test results and keep a list of the medicines you take. How can you care for yourself at home? · Take your antibiotics as directed. Do not stop taking them just because you feel better. You need to take the full course of antibiotics. · Prop up the infected area on pillows to reduce pain and swelling. Try to keep the area above the level of your heart as often as you can. · If your doctor told you how to care for your wound, follow your doctor's instructions. If you did not get instructions, follow this general advice:  ? Wash the wound with clean water 2 times a day. Don't use hydrogen peroxide or alcohol, which can slow healing. ? You may cover the wound with a thin layer of petroleum jelly, such as Vaseline, and a nonstick bandage. ? Apply more petroleum jelly and replace the bandage as needed. · Be safe with medicines. Take pain medicines exactly as directed. ? If the doctor gave you a prescription medicine for pain, take it as prescribed. ? If you are not taking a prescription pain medicine, ask your doctor if you can take an over-the-counter medicine.   To prevent cellulitis in the future  · Try to prevent cuts, scrapes, or other injuries to your skin. Cellulitis most often occurs where there is a break in the skin. · If you get a scrape, cut, mild burn, or bite, wash the wound with clean water as soon as you can to help avoid infection. Don't use hydrogen peroxide or alcohol, which can slow healing. · If you have swelling in your legs (edema), support stockings and good skin care may help prevent leg sores and cellulitis. · Take care of your feet, especially if you have diabetes or other conditions that increase the risk of infection. Wear shoes and socks. Do not go barefoot. If you have athlete's foot or other skin problems on your feet, talk to your doctor about how to treat them. When should you call for help? Call your doctor now or seek immediate medical care if:    · You have signs that your infection is getting worse, such as:  ? Increased pain, swelling, warmth, or redness. ? Red streaks leading from the area. ? Pus draining from the area. ? A fever.     · You get a rash. Watch closely for changes in your health, and be sure to contact your doctor if:    · You do not get better as expected. Where can you learn more? Go to http://www.gray.com/  Enter X309 in the search box to learn more about \"Cellulitis: Care Instructions. \"  Current as of: July 2, 2020               Content Version: 12.8  © 0915-7684 [employer] Care instructions adapted under license by [employer] (which disclaims liability or warranty for this information). If you have questions about a medical condition or this instruction, always ask your healthcare professional. [place] [address] any warranty or liability for your use of this information. Preventing Infection at Home    We care about preventing infection and avoiding the spread of germs - not only when you are in the hospital but also when you return home.  When you return home from the hospital, its important to take the following steps to help prevent infection and avoid spreading germs that could infect you and others. Ask everyone in your home to follow these guidelines, too. Clean Your Hands  · Clean your hands whenever your hands are visibly dirty, before you eat, before or after touching your mouth, nose or eyes, and before preparing food. Clean them after contact with body fluids, using the restroom, touching animals or changing diapers. · When washing hands, wet them with warm water and work up a lather. Rub hands for at least 15 seconds, then rinse them and pat them dry with a clean towel or paper towel. · When using hand sanitizers, it should take about 15 seconds to rub your hands dry. If not, you probably didnt apply enough . Cover Your Sneeze or Cough  Germs are released into the air whenever you sneeze or cough. To prevent the spread of infection:  · Turn away from other people before coughing or sneezing. · Cover your mouth or nose with a tissue when you cough or sneeze. Put the tissue in the trash. · If you dont have a tissue, cough or sneeze into your upper sleeve, not your hands. · Always clean your hands after coughing or sneezing. Care for Wounds  Your skin is your bodys first line of defense against germs, but an open wound leaves an easy way for germs to enter your body. To prevent infection:  · Clean your hands before and after changing wound dressings, and wear gloves to change dressings if recommended by your doctor. · Take special care with IV lines or other devices inserted into the body. If you must touch them, clean your hands first.  · Follow any specific instructions from your doctor to care for your wounds. Contact your doctor if you experience any signs of infection, such as fever or increased redness at the surgical or wound site.     Keep a Clean Home  · Clean or wipe commonly touched hard surfaces like door handles, sinks, tabletops, phones and TV remotes. · Use products labeled disinfectant to kill harmful bacteria and viruses. · Use a clean cloth or paper towel to clean and dry surfaces. Wiping surfaces with a dirty dishcloth, sponge or towel will only spread germs. · Never share toothbrushes, izquierdo, drinking glasses, utensils, razor blades, face cloths or bath towels to avoid spreading germs. · Be sure that the linens that you sleep on are clean. · Keep pets away from wounds and wash your hands after touching pets, their toys or bedding. We care about you and your health. Remember, preventing infections is a team effort between you, your family, friends and health care providers.

## 2021-07-02 NOTE — PROGRESS NOTES
[address] July 2, 2021       RE: Christel Castillo      To Whom It May Concern,    This is to certify that Christel Castillo was admitted to [facility] on June 28, 2021 and discharged on July 2, 2021. She may may return to work on July 12, 2021. Please feel free to contact my office if you have any questions or concerns. Thank you for your assistance in this matter.       Sincerely,  Luis M Orourke NP   [facility]

## 2021-07-02 NOTE — PROGRESS NOTES
Problem: Falls - Risk of  Goal: *Absence of Falls  Description: Document [place] Fall Risk and appropriate interventions in the flowsheet.   Outcome: Progressing Towards Goal  Note: Fall Risk Interventions:  Mobility Interventions: Assess mobility with egress test    Mentation Interventions: Adequate sleep, hydration, pain control    Medication Interventions: Patient to call before getting OOB, Teach patient to arise slowly                   Problem: Patient Education: Go to Patient Education Activity  Goal: Patient/Family Education  Outcome: Progressing Towards Goal

## 2021-07-02 NOTE — PROGRESS NOTES
Discharge instructions given verbalized understanding, without any drainage at puncture sites, at bedside.

## 2021-07-02 NOTE — PROGRESS NOTES
Dressing change to left foot. Looks like new skin is intact under toes and old is sloughing off. No pain, cap refill is < 3 sec,   Applied new xeroform and guaze and wrapped with renetta.

## 2021-07-03 LAB
BACTERIA SPEC CULT: NORMAL
BACTERIA SPEC CULT: NORMAL
SERVICE CMNT-IMP: NORMAL
SERVICE CMNT-IMP: NORMAL

## 2021-07-16 ENCOUNTER — HOSPITAL ENCOUNTER (OUTPATIENT)
Dept: GENERAL RADIOLOGY | Age: 46
Discharge: HOME | End: 2021-07-16
Payer: COMMERCIAL

## 2021-07-16 DIAGNOSIS — M79.672 LEFT FOOT PAIN: ICD-10-CM

## 2021-07-16 DIAGNOSIS — L03.116 CELLULITIS OF LEFT FOOT: ICD-10-CM

## 2021-07-16 PROCEDURE — 73630 X-RAY EXAM OF FOOT: CPT

## 2022-03-18 PROBLEM — L08.9 LEFT FOOT INFECTION: Status: ACTIVE | Noted: 2021-06-28

## 2023-07-05 ENCOUNTER — APPOINTMENT (RX ONLY)
Dept: URBAN - NONMETROPOLITAN AREA CLINIC 1 | Facility: CLINIC | Age: 48
Setting detail: DERMATOLOGY
End: 2023-07-05

## 2023-07-05 DIAGNOSIS — L66.8 OTHER CICATRICIAL ALOPECIA: ICD-10-CM | Status: INADEQUATELY CONTROLLED

## 2023-07-05 DIAGNOSIS — L66.81 CENTRAL CENTRIFUGAL CICATRICIAL ALOPECIA: ICD-10-CM | Status: INADEQUATELY CONTROLLED

## 2023-07-05 PROCEDURE — ? PHOTO-DOCUMENTATION

## 2023-07-05 PROCEDURE — ? COUNSELING

## 2023-07-05 PROCEDURE — ? PRESCRIPTION MEDICATION MANAGEMENT

## 2023-07-05 PROCEDURE — 99204 OFFICE O/P NEW MOD 45 MIN: CPT

## 2023-07-05 PROCEDURE — ? PRESCRIPTION

## 2023-07-05 PROCEDURE — ? MEDICATION COUNSELING

## 2023-07-05 RX ORDER — FINASTERIDE 5 MG/1
TABLET, FILM COATED ORAL
Qty: 15 | Refills: 2 | Status: ERX | COMMUNITY
Start: 2023-07-05

## 2023-07-05 RX ORDER — CLOBETASOL PROPIONATE 0.5 MG/ML
SOLUTION TOPICAL QHS
Qty: 1 | Refills: 2 | Status: ERX | COMMUNITY
Start: 2023-07-05

## 2023-07-05 RX ORDER — DOXYCYCLINE 100 MG/1
CAPSULE ORAL AS DIRECTED
Qty: 60 | Refills: 1 | Status: ERX | COMMUNITY
Start: 2023-07-05

## 2023-07-05 RX ADMIN — FINASTERIDE: 5 TABLET, FILM COATED ORAL at 00:00

## 2023-07-05 RX ADMIN — DOXYCYCLINE: 100 CAPSULE ORAL at 00:00

## 2023-07-05 RX ADMIN — CLOBETASOL PROPIONATE: 0.5 SOLUTION TOPICAL at 00:00

## 2023-07-05 ASSESSMENT — LOCATION DETAILED DESCRIPTION DERM
LOCATION DETAILED: LEFT SUPERIOR PARIETAL SCALP
LOCATION DETAILED: RIGHT CENTRAL PARIETAL SCALP

## 2023-07-05 ASSESSMENT — LOCATION ZONE DERM: LOCATION ZONE: SCALP

## 2023-07-05 ASSESSMENT — LOCATION SIMPLE DESCRIPTION DERM: LOCATION SIMPLE: SCALP

## 2023-07-05 NOTE — PROCEDURE: MEDICATION COUNSELING
Xelchandlerz Pregnancy And Lactation Text: This medication is Pregnancy Category D and is not considered safe during pregnancy.  The risk during breast feeding is also uncertain.

## 2023-07-05 NOTE — PROCEDURE: MEDICATION COUNSELING
Zyclara Counseling:  I discussed with the patient the risks of imiquimod including but not limited to erythema, scaling, itching, weeping, crusting, and pain.  Patient understands that the inflammatory response to imiquimod is variable from person to person and was educated regarded proper titration schedule.  If flu-like symptoms develop, patient knows to discontinue the medication and contact us. Niacinamide Counseling: I recommended taking niacin or niacinamide, also know as vitamin B3, twice daily. Recent evidence suggests that taking vitamin B3 (500 mg twice daily) can reduce the risk of actinic keratoses and non-melanoma skin cancers. Side effects of vitamin B3 include flushing and headache.

## 2023-07-05 NOTE — PROCEDURE: PRESCRIPTION MEDICATION MANAGEMENT
Plan: Start Vitamin E oil few times a week to scalp
Render In Strict Bullet Format?: No
Detail Level: Zone
Initiate Treatment: Doxycycline 100mg BID x2 months after meals \\nFinasteride 2.5mg QD x2 months \\nClobetasol 0.05% scalp solution, 2 droppers to scalp at bedtime (alopecia scalp solution) sent to  pharmacy

## 2023-07-05 NOTE — PROCEDURE: MEDICATION COUNSELING
Elidel Pregnancy And Lactation Text: This medication is Pregnancy Category C. It is unknown if this medication is excreted in breast milk. Denies any Alcohol Intake

## 2023-07-05 NOTE — HPI: HAIR LOSS
Previous Labs: No
How Did The Hair Loss Occur?: sudden in onset
How Severe Is Your Hair Loss?: moderate
What Hair Products Do You Use?: Micehelle shampoo and conditioner

## 2023-10-09 ENCOUNTER — APPOINTMENT (RX ONLY)
Dept: URBAN - NONMETROPOLITAN AREA CLINIC 1 | Facility: CLINIC | Age: 48
Setting detail: DERMATOLOGY
End: 2023-10-09

## 2023-10-09 DIAGNOSIS — L21.8 OTHER SEBORRHEIC DERMATITIS: ICD-10-CM | Status: INADEQUATELY CONTROLLED

## 2023-10-09 DIAGNOSIS — L66.8 OTHER CICATRICIAL ALOPECIA: ICD-10-CM | Status: INADEQUATELY CONTROLLED

## 2023-10-09 DIAGNOSIS — L66.81 CENTRAL CENTRIFUGAL CICATRICIAL ALOPECIA: ICD-10-CM | Status: INADEQUATELY CONTROLLED

## 2023-10-09 PROCEDURE — 99214 OFFICE O/P EST MOD 30 MIN: CPT

## 2023-10-09 PROCEDURE — ? PRESCRIPTION

## 2023-10-09 PROCEDURE — ? PHOTO-DOCUMENTATION

## 2023-10-09 PROCEDURE — ? MEDICATION COUNSELING

## 2023-10-09 PROCEDURE — ? PRESCRIPTION MEDICATION MANAGEMENT

## 2023-10-09 PROCEDURE — ? ORDER TESTS

## 2023-10-09 PROCEDURE — ? COUNSELING

## 2023-10-09 RX ORDER — KETOCONAZOLE 20 MG/ML
SHAMPOO, SUSPENSION TOPICAL AS DIRECTED
Qty: 120 | Refills: 6 | Status: ERX | COMMUNITY
Start: 2023-10-09

## 2023-10-09 RX ORDER — FLUOCINOLONE ACETONIDE 0.11 MG/ML
OIL TOPICAL
Qty: 118.28 | Refills: 6 | Status: ERX | COMMUNITY
Start: 2023-10-09

## 2023-10-09 RX ORDER — PHARMACY COMPOUNDING ACCESSORY
EACH MISCELLANEOUS
Qty: 30 | Refills: 3 | Status: ERX | COMMUNITY
Start: 2023-10-09

## 2023-10-09 RX ORDER — FINASTERIDE 5 MG/1
TABLET, FILM COATED ORAL
Qty: 15 | Refills: 0 | Status: ERX

## 2023-10-09 RX ADMIN — FLUOCINOLONE ACETONIDE: 0.11 OIL TOPICAL at 00:00

## 2023-10-09 RX ADMIN — Medication: at 00:00

## 2023-10-09 RX ADMIN — KETOCONAZOLE: 20 SHAMPOO, SUSPENSION TOPICAL at 00:00

## 2023-10-09 ASSESSMENT — LOCATION SIMPLE DESCRIPTION DERM: LOCATION SIMPLE: SCALP

## 2023-10-09 ASSESSMENT — LOCATION DETAILED DESCRIPTION DERM
LOCATION DETAILED: RIGHT CENTRAL PARIETAL SCALP
LOCATION DETAILED: RIGHT SUPERIOR PARIETAL SCALP
LOCATION DETAILED: LEFT SUPERIOR PARIETAL SCALP

## 2023-10-09 ASSESSMENT — LOCATION ZONE DERM: LOCATION ZONE: SCALP

## 2023-10-09 NOTE — PROCEDURE: PRESCRIPTION MEDICATION MANAGEMENT
Discontinue Regimen: Doxycycline 100mg BID x2 months after meals
Continue Regimen: Clobetasol 0.05% scalp solution, 2 droppers to scalp at bedtime (alopecia scalp solution) sent to  pharmacy\\n\\nFinasteride 2.5mg QD x2 months
Plan: Start Vitamin E oil few times a week to scalp
Render In Strict Bullet Format?: No
Detail Level: Zone
Continue Regimen: ketoconazole 2 % shampoo As directedApply aa scalp in the shower once a week, leave on for 5-10 minutes, then rinse.\\n\\nDerma-Smoothe/FS Scalp Oil 0.01 % Apply aa scalp bid x 2 weeks, then bid weekends only prn for maintenance

## 2023-10-09 NOTE — PROCEDURE: ORDER TESTS
Billing Type: Third-Party Bill
Performing Laboratory: 0
Expected Date Of Service: 10/09/2023
Bill For Surgical Tray: no

## 2023-10-09 NOTE — PROCEDURE: MEDICATION COUNSELING
Skin normal color for race, warm, dry and intact. No evidence of rash. Olumiant Pregnancy And Lactation Text: Based on animal studies, Olumiant may cause embryo-fetal harm when administered to pregnant women.  The medication should not be used in pregnancy.  Breastfeeding is not recommended during treatment.